# Patient Record
Sex: FEMALE | Race: WHITE | NOT HISPANIC OR LATINO | Employment: FULL TIME | URBAN - NONMETROPOLITAN AREA
[De-identification: names, ages, dates, MRNs, and addresses within clinical notes are randomized per-mention and may not be internally consistent; named-entity substitution may affect disease eponyms.]

---

## 2017-04-24 ENCOUNTER — OFFICE VISIT (OUTPATIENT)
Dept: RETAIL CLINIC | Facility: CLINIC | Age: 57
End: 2017-04-24

## 2017-04-24 ENCOUNTER — APPOINTMENT (OUTPATIENT)
Dept: GENERAL RADIOLOGY | Facility: HOSPITAL | Age: 57
End: 2017-04-24

## 2017-04-24 VITALS
OXYGEN SATURATION: 97 % | DIASTOLIC BLOOD PRESSURE: 66 MMHG | HEART RATE: 83 BPM | TEMPERATURE: 99.3 F | SYSTOLIC BLOOD PRESSURE: 100 MMHG

## 2017-04-24 DIAGNOSIS — Z76.89 REFERRAL OF PATIENT WITHOUT EXAMINATION OR TREATMENT: Primary | ICD-10-CM

## 2017-04-24 PROCEDURE — 71020 HC CHEST PA AND LATERAL: CPT

## 2017-04-24 RX ORDER — ASPIRIN 325 MG
325 TABLET ORAL DAILY
COMMUNITY

## 2017-04-24 NOTE — PROGRESS NOTES
"Subjective   Joanna Garcia is a 56 y.o. female.     Flu Symptoms   This is a new problem. The current episode started in the past 7 days. The problem has been gradually worsening. Associated symptoms include chest pain (Tenderness with deep breath), chills, congestion (Some nasal congestion last night; has improved), a fever (102 Yesterday), headaches, nausea (This morning) and neck pain. Pertinent negatives include no coughing (\"not really\") or vomiting. Treatments tried: Ibuprofen for fever. The treatment provided moderate relief.        The following portions of the patient's history were reviewed and updated as appropriate: allergies, current medications, past family history, past medical history, past social history, past surgical history and problem list.    Review of Systems   Constitutional: Positive for chills and fever (102 Yesterday).   HENT: Positive for congestion (Some nasal congestion last night; has improved), rhinorrhea and sinus pressure.    Respiratory: Positive for wheezing. Negative for cough (\"not really\") and chest tightness.    Cardiovascular: Positive for chest pain (Tenderness with deep breath).   Gastrointestinal: Positive for nausea (This morning). Negative for diarrhea and vomiting.   Musculoskeletal: Positive for neck pain.   Allergic/Immunologic: Positive for environmental allergies.   Neurological: Positive for headaches.       Objective   Physical Exam   Constitutional: She appears well-developed. No distress.   Cardiovascular: Normal rate, regular rhythm and normal heart sounds.  Exam reveals no gallop and no friction rub.    No murmur heard.  Pulmonary/Chest: Effort normal. She has decreased breath sounds in the left upper field. She exhibits tenderness.   Skin: She is not diaphoretic.       Assessment/Plan   Joanna was seen today for flu symptoms.    Diagnoses and all orders for this visit:    Referral of patient without examination or treatment      Patient complained of " left-sided chest discomfort with deep breaths as well as left middle back pain.  Former smoker with a history of pneumonia.  Temp of 102 last night.  Patient states recently in hospital for torn rotator cuff, at which point she has extensive evaluation on her heart that was all normal.  No PCP.  Referred to higher level of care for further evaluation and follow up. Patient agreed to plan.

## 2017-07-16 ENCOUNTER — HOSPITAL ENCOUNTER (EMERGENCY)
Age: 57
Discharge: HOME | End: 2017-07-16
Payer: COMMERCIAL

## 2017-07-16 ENCOUNTER — APPOINTMENT (OUTPATIENT)
Dept: GENERAL RADIOLOGY | Age: 57
End: 2017-07-16
Payer: COMMERCIAL

## 2017-07-16 VITALS
WEIGHT: 100 LBS | HEART RATE: 87 BPM | TEMPERATURE: 98.9 F | SYSTOLIC BLOOD PRESSURE: 111 MMHG | BODY MASS INDEX: 17.07 KG/M2 | HEIGHT: 64 IN | DIASTOLIC BLOOD PRESSURE: 64 MMHG | OXYGEN SATURATION: 98 % | RESPIRATION RATE: 17 BRPM

## 2017-07-16 DIAGNOSIS — J18.9 PNEUMONIA DUE TO ORGANISM: Primary | ICD-10-CM

## 2017-07-16 PROCEDURE — 71020 XR CHEST STANDARD TWO VW: CPT

## 2017-07-16 PROCEDURE — 99285 EMERGENCY DEPT VISIT HI MDM: CPT

## 2017-07-16 PROCEDURE — 99283 EMERGENCY DEPT VISIT LOW MDM: CPT | Performed by: NURSE PRACTITIONER

## 2017-07-16 PROCEDURE — 6370000000 HC RX 637 (ALT 250 FOR IP): Performed by: NURSE PRACTITIONER

## 2017-07-16 PROCEDURE — 94640 AIRWAY INHALATION TREATMENT: CPT

## 2017-07-16 RX ORDER — IPRATROPIUM BROMIDE AND ALBUTEROL SULFATE 2.5; .5 MG/3ML; MG/3ML
1 SOLUTION RESPIRATORY (INHALATION) EVERY 4 HOURS
Qty: 150 ML | Refills: 0 | Status: ON HOLD | OUTPATIENT
Start: 2017-07-16 | End: 2021-12-07 | Stop reason: HOSPADM

## 2017-07-16 RX ORDER — LEVOFLOXACIN 500 MG/1
500 TABLET, FILM COATED ORAL DAILY
Qty: 10 TABLET | Refills: 0 | Status: SHIPPED | OUTPATIENT
Start: 2017-07-16 | End: 2017-07-26

## 2017-07-16 RX ORDER — METHYLPREDNISOLONE 4 MG/1
TABLET ORAL
Qty: 1 KIT | Refills: 0 | Status: SHIPPED | OUTPATIENT
Start: 2017-07-16 | End: 2017-07-22

## 2017-07-16 RX ORDER — IPRATROPIUM BROMIDE AND ALBUTEROL SULFATE 2.5; .5 MG/3ML; MG/3ML
1 SOLUTION RESPIRATORY (INHALATION) ONCE
Status: COMPLETED | OUTPATIENT
Start: 2017-07-16 | End: 2017-07-16

## 2017-07-16 RX ADMIN — IPRATROPIUM BROMIDE AND ALBUTEROL SULFATE 1 AMPULE: .5; 2.5 SOLUTION RESPIRATORY (INHALATION) at 20:01

## 2017-07-16 ASSESSMENT — ENCOUNTER SYMPTOMS
SHORTNESS OF BREATH: 1
COUGH: 1
RHINORRHEA: 0

## 2017-07-16 ASSESSMENT — PAIN SCALES - GENERAL: PAINLEVEL_OUTOF10: 8

## 2017-07-17 ENCOUNTER — APPOINTMENT (OUTPATIENT)
Dept: GENERAL RADIOLOGY | Facility: HOSPITAL | Age: 57
End: 2017-07-17

## 2017-07-17 PROCEDURE — 71020 HC CHEST PA AND LATERAL: CPT

## 2017-12-01 ENCOUNTER — HOSPITAL ENCOUNTER (EMERGENCY)
Facility: HOSPITAL | Age: 57
Discharge: HOME | End: 2017-12-01
Attending: EMERGENCY MEDICINE | Admitting: EMERGENCY MEDICINE

## 2017-12-01 ENCOUNTER — APPOINTMENT (OUTPATIENT)
Dept: GENERAL RADIOLOGY | Facility: HOSPITAL | Age: 57
End: 2017-12-01

## 2017-12-01 VITALS
WEIGHT: 98 LBS | DIASTOLIC BLOOD PRESSURE: 65 MMHG | RESPIRATION RATE: 29 BRPM | TEMPERATURE: 99.2 F | BODY MASS INDEX: 16.73 KG/M2 | OXYGEN SATURATION: 95 % | HEIGHT: 64 IN | HEART RATE: 98 BPM | SYSTOLIC BLOOD PRESSURE: 105 MMHG

## 2017-12-01 DIAGNOSIS — J40 BRONCHITIS: Primary | ICD-10-CM

## 2017-12-01 LAB
ALBUMIN SERPL-MCNC: 4.4 G/DL (ref 3.5–5)
ALBUMIN/GLOB SERPL: 1.3 G/DL (ref 1.1–2.5)
ALP SERPL-CCNC: 88 U/L (ref 24–120)
ALT SERPL W P-5'-P-CCNC: 37 U/L (ref 0–54)
ANION GAP SERPL CALCULATED.3IONS-SCNC: 8 MMOL/L (ref 4–13)
AST SERPL-CCNC: 33 U/L (ref 7–45)
BASOPHILS # BLD AUTO: 0.06 10*3/MM3 (ref 0–0.2)
BASOPHILS NFR BLD AUTO: 0.4 % (ref 0–2)
BILIRUB SERPL-MCNC: 0.3 MG/DL (ref 0.1–1)
BUN BLD-MCNC: 10 MG/DL (ref 5–21)
BUN/CREAT SERPL: 16.1 (ref 7–25)
CALCIUM SPEC-SCNC: 9.8 MG/DL (ref 8.4–10.4)
CHLORIDE SERPL-SCNC: 99 MMOL/L (ref 98–110)
CO2 SERPL-SCNC: 32 MMOL/L (ref 24–31)
CREAT BLD-MCNC: 0.62 MG/DL (ref 0.5–1.4)
D-LACTATE SERPL-SCNC: 1 MMOL/L (ref 0.5–2)
DEPRECATED RDW RBC AUTO: 39.5 FL (ref 40–54)
EOSINOPHIL # BLD AUTO: 0.07 10*3/MM3 (ref 0–0.7)
EOSINOPHIL NFR BLD AUTO: 0.5 % (ref 0–4)
ERYTHROCYTE [DISTWIDTH] IN BLOOD BY AUTOMATED COUNT: 13.2 % (ref 12–15)
FLUAV AG NPH QL: NEGATIVE
FLUBV AG NPH QL IA: NEGATIVE
GFR SERPL CREATININE-BSD FRML MDRD: 99 ML/MIN/1.73
GLOBULIN UR ELPH-MCNC: 3.3 GM/DL
GLUCOSE BLD-MCNC: 93 MG/DL (ref 70–100)
HCT VFR BLD AUTO: 37.4 % (ref 37–47)
HGB BLD-MCNC: 12.4 G/DL (ref 12–16)
HOLD SPECIMEN: NORMAL
HOLD SPECIMEN: NORMAL
IMM GRANULOCYTES # BLD: 0.04 10*3/MM3 (ref 0–0.03)
IMM GRANULOCYTES NFR BLD: 0.3 % (ref 0–5)
LYMPHOCYTES # BLD AUTO: 1.95 10*3/MM3 (ref 0.72–4.86)
LYMPHOCYTES NFR BLD AUTO: 14.2 % (ref 15–45)
MCH RBC QN AUTO: 27.4 PG (ref 28–32)
MCHC RBC AUTO-ENTMCNC: 33.2 G/DL (ref 33–36)
MCV RBC AUTO: 82.6 FL (ref 82–98)
MONOCYTES # BLD AUTO: 0.84 10*3/MM3 (ref 0.19–1.3)
MONOCYTES NFR BLD AUTO: 6.1 % (ref 4–12)
NEUTROPHILS # BLD AUTO: 10.74 10*3/MM3 (ref 1.87–8.4)
NEUTROPHILS NFR BLD AUTO: 78.5 % (ref 39–78)
NRBC BLD MANUAL-RTO: 0 /100 WBC (ref 0–0)
PLATELET # BLD AUTO: 295 10*3/MM3 (ref 130–400)
PMV BLD AUTO: 9.4 FL (ref 6–12)
POTASSIUM BLD-SCNC: 3.4 MMOL/L (ref 3.5–5.3)
PROT SERPL-MCNC: 7.7 G/DL (ref 6.3–8.7)
RBC # BLD AUTO: 4.53 10*6/MM3 (ref 4.2–5.4)
SODIUM BLD-SCNC: 139 MMOL/L (ref 135–145)
WBC NRBC COR # BLD: 13.7 10*3/MM3 (ref 4.8–10.8)
WHOLE BLOOD HOLD SPECIMEN: NORMAL
WHOLE BLOOD HOLD SPECIMEN: NORMAL

## 2017-12-01 PROCEDURE — 99284 EMERGENCY DEPT VISIT MOD MDM: CPT

## 2017-12-01 PROCEDURE — 94640 AIRWAY INHALATION TREATMENT: CPT

## 2017-12-01 PROCEDURE — 80053 COMPREHEN METABOLIC PANEL: CPT | Performed by: EMERGENCY MEDICINE

## 2017-12-01 PROCEDURE — 85025 COMPLETE CBC W/AUTO DIFF WBC: CPT | Performed by: EMERGENCY MEDICINE

## 2017-12-01 PROCEDURE — 87040 BLOOD CULTURE FOR BACTERIA: CPT | Performed by: EMERGENCY MEDICINE

## 2017-12-01 PROCEDURE — 71020 HC CHEST PA AND LATERAL: CPT

## 2017-12-01 PROCEDURE — 83605 ASSAY OF LACTIC ACID: CPT | Performed by: EMERGENCY MEDICINE

## 2017-12-01 PROCEDURE — 87804 INFLUENZA ASSAY W/OPTIC: CPT | Performed by: EMERGENCY MEDICINE

## 2017-12-01 PROCEDURE — 36415 COLL VENOUS BLD VENIPUNCTURE: CPT | Performed by: EMERGENCY MEDICINE

## 2017-12-01 RX ORDER — LEVOFLOXACIN 500 MG/1
500 TABLET, FILM COATED ORAL DAILY
Qty: 7 TABLET | Refills: 0 | Status: SHIPPED | OUTPATIENT
Start: 2017-12-01 | End: 2018-05-11

## 2017-12-01 RX ORDER — IPRATROPIUM BROMIDE AND ALBUTEROL SULFATE 2.5; .5 MG/3ML; MG/3ML
3 SOLUTION RESPIRATORY (INHALATION) ONCE
Status: COMPLETED | OUTPATIENT
Start: 2017-12-01 | End: 2017-12-01

## 2017-12-01 RX ORDER — METHYLPREDNISOLONE 4 MG/1
TABLET ORAL
Qty: 1 EACH | Refills: 0 | Status: SHIPPED | OUTPATIENT
Start: 2017-12-01 | End: 2018-05-11

## 2017-12-01 RX ORDER — SODIUM CHLORIDE 0.9 % (FLUSH) 0.9 %
10 SYRINGE (ML) INJECTION AS NEEDED
Status: DISCONTINUED | OUTPATIENT
Start: 2017-12-01 | End: 2017-12-01 | Stop reason: HOSPADM

## 2017-12-01 RX ADMIN — IPRATROPIUM BROMIDE AND ALBUTEROL SULFATE 3 ML: 2.5; .5 SOLUTION RESPIRATORY (INHALATION) at 16:10

## 2017-12-01 NOTE — ED PROVIDER NOTES
Subjective   HPI Comments: Patient complains of cough and congestion for the past 5-7 days.  She says she is getting getting increasingly short of breath.  She says her throat is sore and sometimes loses her voice.  She says her cough is nonproductive.  She says she has had fever but does not know how high.  She is concerned that she either has pneumonia or the flu.  She quit smoking about 4 years ago.    Patient is a 57 y.o. female presenting with cough.   History provided by:  Patient   used: No    Cough   Cough characteristics:  Non-productive  Severity:  Severe  Onset quality:  Gradual  Duration:  1 week  Timing:  Intermittent  Progression:  Unchanged  Chronicity:  New  Smoker: no    Context: not animal exposure, not exposure to allergens, not fumes, not occupational exposure, not sick contacts, not smoke exposure, not upper respiratory infection, not weather changes and not with activity    Relieved by:  Nothing  Worsened by:  Nothing  Ineffective treatments:  None tried  Associated symptoms: fever, shortness of breath, sinus congestion, sore throat and wheezing    Associated symptoms: no chest pain, no chills, no diaphoresis, no ear fullness, no ear pain, no eye discharge, no headaches, no myalgias, no rash, no rhinorrhea and no weight loss        Review of Systems   Constitutional: Positive for fever. Negative for chills, diaphoresis and weight loss.   HENT: Positive for sore throat. Negative for ear pain and rhinorrhea.    Eyes: Negative for discharge.   Respiratory: Positive for cough, shortness of breath and wheezing.    Cardiovascular: Negative.  Negative for chest pain.   Gastrointestinal: Negative.    Genitourinary: Negative.    Musculoskeletal: Negative.  Negative for myalgias.   Skin: Negative for rash.   Neurological: Negative.  Negative for headaches.   Hematological: Negative.    Psychiatric/Behavioral: Negative.    All other systems reviewed and are negative.      Past Medical  History:   Diagnosis Date   • Pneumonia    • Torn rotator cuff        No Known Allergies    Past Surgical History:   Procedure Laterality Date   •  SECTION     • HYSTERECTOMY         Family History   Problem Relation Age of Onset   • Cancer Mother    • Heart disease Father        Social History     Social History   • Marital status:      Spouse name: N/A   • Number of children: N/A   • Years of education: N/A     Social History Main Topics   • Smoking status: Former Smoker   • Smokeless tobacco: None   • Alcohol use No   • Drug use: No   • Sexual activity: Defer     Other Topics Concern   • None     Social History Narrative   • None       Prior to Admission medications    Medication Sig Start Date End Date Taking? Authorizing Provider   aspirin 325 MG tablet Take 325 mg by mouth Daily.    Historical Provider, MD   levoFLOXacin (LEVAQUIN) 500 MG tablet Take 1 tablet by mouth Daily. 17   CINTIA Hernandez   Multiple Vitamins-Minerals (MULTIVITAMIN ADULT PO) Take  by mouth.    Historical Provider, MD       Medications   sodium chloride 0.9 % flush 10 mL (not administered)   ipratropium-albuterol (DUO-NEB) nebulizer solution 3 mL (3 mL Nebulization Given 17 1610)       Vitals:    17 1634   BP: 115/63   Pulse: 105   Resp: 20   Temp:    SpO2: 95%         Objective   Physical Exam   Constitutional: She is oriented to person, place, and time. She appears well-developed and well-nourished.   HENT:   Head: Normocephalic and atraumatic.   Mouth/Throat: Oropharynx is clear and moist.   Neck: Normal range of motion. Neck supple.   Cardiovascular: Normal rate and regular rhythm.    Pulmonary/Chest:   Patient is mildly tachypnea but not significantly labored.  She does have diffuse scattered wheezes and rhonchi.   Abdominal: Soft. Bowel sounds are normal.   Musculoskeletal: Normal range of motion.   Neurological: She is alert and oriented to person, place, and time.   Skin: Skin is warm.    Psychiatric: She has a normal mood and affect. Her behavior is normal.   Nursing note and vitals reviewed.      Procedures         Lab Results (last 24 hours)     Procedure Component Value Units Date/Time    CBC & Differential [[id number]] Collected:  12/01/17 1609    Specimen:  Blood Updated:  12/01/17 1627    Narrative:       The following orders were created for panel order CBC & Differential.  Procedure                               Abnormality         Status                     ---------                               -----------         ------                     CBC Auto Differential[[id number]]         Abnormal            Final result                 Please view results for these tests on the individual orders.    Comprehensive Metabolic Panel [[id number]]  (Abnormal) Collected:  12/01/17 1609    Specimen:  Blood Updated:  12/01/17 1647     Glucose 93 mg/dL      BUN 10 mg/dL      Creatinine 0.62 mg/dL      Sodium 139 mmol/L      Potassium 3.4 (L) mmol/L      Chloride 99 mmol/L      CO2 32.0 (H) mmol/L      Calcium 9.8 mg/dL      Total Protein 7.7 g/dL      Albumin 4.40 g/dL      ALT (SGPT) 37 U/L      AST (SGOT) 33 U/L      Alkaline Phosphatase 88 U/L      Total Bilirubin 0.3 mg/dL      eGFR Non African Amer 99 mL/min/1.73      Globulin 3.3 gm/dL      A/G Ratio 1.3 g/dL      BUN/Creatinine Ratio 16.1     Anion Gap 8.0 mmol/L     Blood Culture - Blood, [[id number]] Collected:  12/01/17 1609    Specimen:  Blood from Arm, Right Updated:  12/01/17 1625    Blood Culture - Blood, [[id number]] Collected:  12/01/17 1609    Specimen:  Blood from Arm, Right Updated:  12/01/17 1625    Lactic Acid, Plasma [[id number]]  (Normal) Collected:  12/01/17 1609    Specimen:  Blood Updated:  12/01/17 1641     Lactate 1.0 mmol/L     CBC Auto Differential [[id number]]  (Abnormal) Collected:  12/01/17 1609    Specimen:  Blood Updated:  12/01/17 1627     WBC 13.70 (H) 10*3/mm3      RBC 4.53 10*6/mm3      Hemoglobin 12.4 g/dL      Hematocrit 37.4  %      MCV 82.6 fL      MCH 27.4 (L) pg      MCHC 33.2 g/dL      RDW 13.2 %      RDW-SD 39.5 (L) fl      MPV 9.4 fL      Platelets 295 10*3/mm3      Neutrophil % 78.5 (H) %      Lymphocyte % 14.2 (L) %      Monocyte % 6.1 %      Eosinophil % 0.5 %      Basophil % 0.4 %      Immature Grans % 0.3 %      Neutrophils, Absolute 10.74 (H) 10*3/mm3      Lymphocytes, Absolute 1.95 10*3/mm3      Monocytes, Absolute 0.84 10*3/mm3      Eosinophils, Absolute 0.07 10*3/mm3      Basophils, Absolute 0.06 10*3/mm3      Immature Grans, Absolute 0.04 (H) 10*3/mm3      nRBC 0.0 /100 WBC     Influenza Antigen, Rapid - Swab, Nasopharynx [[id number]]  (Normal) Collected:  12/01/17 1635    Specimen:  Swab from Nasopharynx Updated:  12/01/17 1654     Influenza A Ag, EIA Negative     Influenza B Ag, EIA Negative    Narrative:         Recommend confirmation of negative results by viral culture or molecular assay.          XR Chest 2 View   Final Result   1. Chronic change in the pulmonary parenchyma. There is no evidence of   active disease.           This report was finalized on [id number] by Dr. Jose Barrett MD.          ED Course  ED Course   Comment By Time   I told the patient that her testing here was okay and that basically she has a bronchitis and we will treat that. Kana Scales Jr., MD 12/01 1714          MDM  Number of Diagnoses or Management Options  Bronchitis: new and requires workup     Amount and/or Complexity of Data Reviewed  Clinical lab tests: ordered and reviewed  Tests in the radiology section of CPT®: ordered and reviewed    Risk of Complications, Morbidity, and/or Mortality  Presenting problems: moderate  Diagnostic procedures: moderate  Management options: moderate    Patient Progress  Patient progress: stable      Final diagnoses:   Bronchitis          Kana Scales Jr., MD  12/01/17 8931

## 2017-12-06 LAB
BACTERIA SPEC AEROBE CULT: NORMAL
BACTERIA SPEC AEROBE CULT: NORMAL

## 2017-12-21 ENCOUNTER — HOSPITAL ENCOUNTER (EMERGENCY)
Facility: HOSPITAL | Age: 57
Discharge: HOME | End: 2017-12-21
Attending: EMERGENCY MEDICINE | Admitting: EMERGENCY MEDICINE

## 2017-12-21 ENCOUNTER — APPOINTMENT (OUTPATIENT)
Dept: GENERAL RADIOLOGY | Facility: HOSPITAL | Age: 57
End: 2017-12-21

## 2017-12-21 VITALS
SYSTOLIC BLOOD PRESSURE: 101 MMHG | TEMPERATURE: 98.9 F | DIASTOLIC BLOOD PRESSURE: 54 MMHG | RESPIRATION RATE: 20 BRPM | OXYGEN SATURATION: 100 % | HEART RATE: 74 BPM | HEIGHT: 64 IN | WEIGHT: 92 LBS | BODY MASS INDEX: 15.71 KG/M2

## 2017-12-21 DIAGNOSIS — R55 VASOVAGAL NEAR SYNCOPE: ICD-10-CM

## 2017-12-21 DIAGNOSIS — E86.0 DEHYDRATION: ICD-10-CM

## 2017-12-21 DIAGNOSIS — J20.9 ACUTE BRONCHITIS, UNSPECIFIED ORGANISM: Primary | ICD-10-CM

## 2017-12-21 DIAGNOSIS — R53.1 WEAKNESS: ICD-10-CM

## 2017-12-21 LAB
ALBUMIN SERPL-MCNC: 4.1 G/DL (ref 3.5–5)
ALBUMIN/GLOB SERPL: 1.5 G/DL (ref 1.1–2.5)
ALP SERPL-CCNC: 63 U/L (ref 24–120)
ALT SERPL W P-5'-P-CCNC: 34 U/L (ref 0–54)
ANION GAP SERPL CALCULATED.3IONS-SCNC: 9 MMOL/L (ref 4–13)
APTT PPP: 27.3 SECONDS (ref 24.1–34.8)
ARTERIAL PATENCY WRIST A: ABNORMAL
AST SERPL-CCNC: 29 U/L (ref 7–45)
ATMOSPHERIC PRESS: 749 MMHG
BACTERIA UR QL AUTO: ABNORMAL /HPF
BASE EXCESS BLDA CALC-SCNC: 3.4 MMOL/L (ref 0–2)
BASOPHILS # BLD AUTO: 0.03 10*3/MM3 (ref 0–0.2)
BASOPHILS NFR BLD AUTO: 0.5 % (ref 0–2)
BDY SITE: ABNORMAL
BILIRUB SERPL-MCNC: 0.3 MG/DL (ref 0.1–1)
BILIRUB UR QL STRIP: NEGATIVE
BODY TEMPERATURE: 37 C
BUN BLD-MCNC: 15 MG/DL (ref 5–21)
BUN/CREAT SERPL: 23.4 (ref 7–25)
CA-I BLD-MCNC: 4.71 MG/DL (ref 4.6–5.4)
CALCIUM SPEC-SCNC: 9.5 MG/DL (ref 8.4–10.4)
CHLORIDE SERPL-SCNC: 99 MMOL/L (ref 98–110)
CLARITY UR: ABNORMAL
CO2 SERPL-SCNC: 30 MMOL/L (ref 24–31)
COLOR UR: YELLOW
CREAT BLD-MCNC: 0.64 MG/DL (ref 0.5–1.4)
CRP SERPL-MCNC: 2.07 MG/DL (ref 0–0.99)
D DIMER PPP FEU-MCNC: 0.38 MG/L (FEU) (ref 0–0.5)
D-LACTATE SERPL-SCNC: 0.9 MMOL/L (ref 0.5–2)
DEPRECATED RDW RBC AUTO: 41.1 FL (ref 40–54)
EOSINOPHIL # BLD AUTO: 0 10*3/MM3 (ref 0–0.7)
EOSINOPHIL NFR BLD AUTO: 0 % (ref 0–4)
ERYTHROCYTE [DISTWIDTH] IN BLOOD BY AUTOMATED COUNT: 13.9 % (ref 12–15)
FLUAV AG NPH QL: NEGATIVE
FLUBV AG NPH QL IA: NEGATIVE
GFR SERPL CREATININE-BSD FRML MDRD: 96 ML/MIN/1.73
GLOBULIN UR ELPH-MCNC: 2.8 GM/DL
GLUCOSE BLD-MCNC: 87 MG/DL (ref 70–100)
GLUCOSE UR STRIP-MCNC: NEGATIVE MG/DL
HCO3 BLDA-SCNC: 26.8 MMOL/L (ref 20–26)
HCT VFR BLD AUTO: 37.2 % (ref 37–47)
HGB BLD-MCNC: 12.6 G/DL (ref 12–16)
HGB UR QL STRIP.AUTO: ABNORMAL
HOLD SPECIMEN: NORMAL
HOLD SPECIMEN: NORMAL
HYALINE CASTS UR QL AUTO: ABNORMAL /LPF
IMM GRANULOCYTES # BLD: 0.01 10*3/MM3 (ref 0–0.03)
IMM GRANULOCYTES NFR BLD: 0.2 % (ref 0–5)
INR PPP: 0.94 (ref 0.91–1.09)
KETONES UR QL STRIP: ABNORMAL
LEUKOCYTE ESTERASE UR QL STRIP.AUTO: NEGATIVE
LYMPHOCYTES # BLD AUTO: 1.02 10*3/MM3 (ref 0.72–4.86)
LYMPHOCYTES NFR BLD AUTO: 17.6 % (ref 15–45)
Lab: ABNORMAL
Lab: NORMAL
MAGNESIUM SERPL-MCNC: 1.5 MG/DL (ref 1.4–2.2)
MCH RBC QN AUTO: 27.9 PG (ref 28–32)
MCHC RBC AUTO-ENTMCNC: 33.9 G/DL (ref 33–36)
MCV RBC AUTO: 82.3 FL (ref 82–98)
MODALITY: ABNORMAL
MONOCYTES # BLD AUTO: 0.46 10*3/MM3 (ref 0.19–1.3)
MONOCYTES NFR BLD AUTO: 8 % (ref 4–12)
NEUTROPHILS # BLD AUTO: 4.26 10*3/MM3 (ref 1.87–8.4)
NEUTROPHILS NFR BLD AUTO: 73.7 % (ref 39–78)
NITRITE UR QL STRIP: NEGATIVE
NRBC BLD MANUAL-RTO: 0 /100 WBC (ref 0–0)
NT-PROBNP SERPL-MCNC: 98.4 PG/ML (ref 0–900)
PCO2 BLDA: 35.8 MM HG (ref 35–45)
PH BLDA: 7.48 PH UNITS (ref 7.35–7.45)
PH UR STRIP.AUTO: 5.5 [PH] (ref 5–8)
PHOSPHATE SERPL-MCNC: 2.9 MG/DL (ref 2.5–4.5)
PLATELET # BLD AUTO: 235 10*3/MM3 (ref 130–400)
PMV BLD AUTO: 9.6 FL (ref 6–12)
PO2 BLDA: 71.4 MM HG (ref 83–108)
POTASSIUM BLD-SCNC: 3.9 MMOL/L (ref 3.5–5.3)
PROT SERPL-MCNC: 6.9 G/DL (ref 6.3–8.7)
PROT UR QL STRIP: NEGATIVE
PROTHROMBIN TIME: 12.9 SECONDS (ref 11.9–14.6)
RBC # BLD AUTO: 4.52 10*6/MM3 (ref 4.2–5.4)
RBC # UR: ABNORMAL /HPF
REF LAB TEST METHOD: ABNORMAL
SAO2 % BLDCOA: 96.2 % (ref 94–99)
SODIUM BLD-SCNC: 138 MMOL/L (ref 135–145)
SP GR UR STRIP: >1.03 (ref 1–1.03)
SQUAMOUS #/AREA URNS HPF: ABNORMAL /HPF
TROPONIN I SERPL-MCNC: <0.012 NG/ML (ref 0–0.03)
UROBILINOGEN UR QL STRIP: ABNORMAL
VENTILATOR MODE: ABNORMAL
WBC NRBC COR # BLD: 5.78 10*3/MM3 (ref 4.8–10.8)
WBC UR QL AUTO: ABNORMAL /HPF
WHOLE BLOOD HOLD SPECIMEN: NORMAL
WHOLE BLOOD HOLD SPECIMEN: NORMAL

## 2017-12-21 PROCEDURE — 84100 ASSAY OF PHOSPHORUS: CPT | Performed by: EMERGENCY MEDICINE

## 2017-12-21 PROCEDURE — 71010 HC CHEST PA OR AP: CPT

## 2017-12-21 PROCEDURE — 81001 URINALYSIS AUTO W/SCOPE: CPT | Performed by: EMERGENCY MEDICINE

## 2017-12-21 PROCEDURE — 87040 BLOOD CULTURE FOR BACTERIA: CPT | Performed by: EMERGENCY MEDICINE

## 2017-12-21 PROCEDURE — 83735 ASSAY OF MAGNESIUM: CPT | Performed by: EMERGENCY MEDICINE

## 2017-12-21 PROCEDURE — 93010 ELECTROCARDIOGRAM REPORT: CPT | Performed by: INTERNAL MEDICINE

## 2017-12-21 PROCEDURE — 93005 ELECTROCARDIOGRAM TRACING: CPT | Performed by: EMERGENCY MEDICINE

## 2017-12-21 PROCEDURE — 85730 THROMBOPLASTIN TIME PARTIAL: CPT | Performed by: EMERGENCY MEDICINE

## 2017-12-21 PROCEDURE — 25010000002 PIPERACILLIN SOD-TAZOBACTAM PER 1 G: Performed by: EMERGENCY MEDICINE

## 2017-12-21 PROCEDURE — 87804 INFLUENZA ASSAY W/OPTIC: CPT | Performed by: EMERGENCY MEDICINE

## 2017-12-21 PROCEDURE — 80053 COMPREHEN METABOLIC PANEL: CPT | Performed by: EMERGENCY MEDICINE

## 2017-12-21 PROCEDURE — 83880 ASSAY OF NATRIURETIC PEPTIDE: CPT | Performed by: EMERGENCY MEDICINE

## 2017-12-21 PROCEDURE — 84484 ASSAY OF TROPONIN QUANT: CPT | Performed by: EMERGENCY MEDICINE

## 2017-12-21 PROCEDURE — 86140 C-REACTIVE PROTEIN: CPT | Performed by: EMERGENCY MEDICINE

## 2017-12-21 PROCEDURE — 96366 THER/PROPH/DIAG IV INF ADDON: CPT

## 2017-12-21 PROCEDURE — 36600 WITHDRAWAL OF ARTERIAL BLOOD: CPT

## 2017-12-21 PROCEDURE — 99285 EMERGENCY DEPT VISIT HI MDM: CPT

## 2017-12-21 PROCEDURE — 83605 ASSAY OF LACTIC ACID: CPT | Performed by: EMERGENCY MEDICINE

## 2017-12-21 PROCEDURE — 96365 THER/PROPH/DIAG IV INF INIT: CPT

## 2017-12-21 PROCEDURE — 85379 FIBRIN DEGRADATION QUANT: CPT | Performed by: EMERGENCY MEDICINE

## 2017-12-21 PROCEDURE — 85025 COMPLETE CBC W/AUTO DIFF WBC: CPT | Performed by: EMERGENCY MEDICINE

## 2017-12-21 PROCEDURE — 85610 PROTHROMBIN TIME: CPT | Performed by: EMERGENCY MEDICINE

## 2017-12-21 PROCEDURE — 82330 ASSAY OF CALCIUM: CPT

## 2017-12-21 PROCEDURE — 82803 BLOOD GASES ANY COMBINATION: CPT

## 2017-12-21 RX ORDER — METHYLPREDNISOLONE 4 MG/1
TABLET ORAL
Qty: 21 TABLET | Refills: 0 | Status: SHIPPED | OUTPATIENT
Start: 2017-12-21 | End: 2018-05-11

## 2017-12-21 RX ORDER — AZITHROMYCIN 250 MG/1
TABLET, FILM COATED ORAL
Qty: 6 TABLET | Refills: 0 | Status: SHIPPED | OUTPATIENT
Start: 2017-12-21 | End: 2018-05-11

## 2017-12-21 RX ORDER — SODIUM CHLORIDE 0.9 % (FLUSH) 0.9 %
10 SYRINGE (ML) INJECTION AS NEEDED
Status: DISCONTINUED | OUTPATIENT
Start: 2017-12-21 | End: 2017-12-21 | Stop reason: HOSPADM

## 2017-12-21 RX ADMIN — TAZOBACTAM SODIUM AND PIPERACILLIN SODIUM 4.5 G: 500; 4 INJECTION, SOLUTION INTRAVENOUS at 15:35

## 2017-12-21 NOTE — ED PROVIDER NOTES
Subjective   HPI Comments: Patient was recently treated with the antibiotics today she came in and nausea and vomiting and a near syncopal episode no loss of consciousness not feeling well short of breath in the ER for evaluation of the same    Patient is a 57 y.o. female presenting with syncope.   Syncope   Episode history:  Single  Most recent episode:  Yesterday  Timing:  Constant  Progression:  Resolved  Context: standing up    Context: not blood draw, not bowel movement, not exertion, not medication change, not with normal activity and not sight of blood    Witnessed: no    Relieved by:  Nothing  Worsened by:  Nothing  Ineffective treatments:  None tried  Associated symptoms: diaphoresis, malaise/fatigue, nausea and weakness    Associated symptoms: no anxiety, no chest pain, no confusion, no dizziness, no fever, no headaches, no palpitations, no recent injury, no seizures, no shortness of breath and no vomiting        Review of Systems   Constitutional: Positive for diaphoresis and malaise/fatigue. Negative for fever.   HENT: Negative.    Eyes: Negative.    Respiratory: Negative.  Negative for shortness of breath.    Cardiovascular: Positive for syncope. Negative for chest pain and palpitations.   Gastrointestinal: Positive for nausea. Negative for vomiting.   Musculoskeletal: Negative.  Negative for back pain and neck pain.   Skin: Negative.    Neurological: Positive for weakness. Negative for dizziness, seizures and headaches.   Psychiatric/Behavioral: Negative for confusion.   All other systems reviewed and are negative.      Past Medical History:   Diagnosis Date   • Pneumonia    • Torn rotator cuff        No Known Allergies    Past Surgical History:   Procedure Laterality Date   •  SECTION     • HYSTERECTOMY         Family History   Problem Relation Age of Onset   • Cancer Mother    • Heart disease Father        Social History     Social History   • Marital status:      Spouse name: N/A   •  Number of children: N/A   • Years of education: N/A     Social History Main Topics   • Smoking status: Former Smoker     Quit date: 12/21/2013   • Smokeless tobacco: Never Used   • Alcohol use No   • Drug use: No   • Sexual activity: Defer     Other Topics Concern   • None     Social History Narrative   • None           Objective   Physical Exam   Constitutional: She is oriented to person, place, and time. She appears well-developed and well-nourished.   HENT:   Head: Normocephalic.   Right Ear: External ear normal.   Eyes: Conjunctivae are normal. Pupils are equal, round, and reactive to light.   Neck: Normal range of motion. Neck supple.   Cardiovascular: Normal rate, regular rhythm, normal heart sounds and intact distal pulses.  PMI is not displaced.  Exam reveals no decreased pulses.    No murmur heard.  Pulmonary/Chest: Effort normal. No accessory muscle usage. No tachypnea. No respiratory distress. She has no decreased breath sounds. She has wheezes. She has no rales. She exhibits no tenderness.   Abdominal: Soft. Bowel sounds are normal. There is no tenderness.   Musculoskeletal: Normal range of motion. She exhibits no edema or tenderness.   Lower extremity exam bilaterally is unremarkable.  There is no right or left calf tenderness .  There is no palpable venous cord.  No obvious difference in the size of the legs.  No pitting edema.  The dorsalis pedis and posterior tibial femoral and popliteal pulses are palpable and +2 bilaterally.  Homans sign is negative       Vascular Status -  Her exam exhibits right foot vasculature normal. Her exam exhibits no right foot edema. Her exam exhibits left foot vasculature normal. Her exam exhibits no left foot edema.  Neurological: She is alert and oriented to person, place, and time. She has normal reflexes. No cranial nerve deficit. Coordination normal.   Skin: Skin is warm. No rash noted. No erythema.   Nursing note and vitals reviewed.      Procedures         ED  Course  ED Course   Comment By Time   Patient's workup is negative lab workup was negative patient given IV fluids she had a near syncopal episode today currently awake and alert will be discharged home with a follow-up with the primary [facility]. Iker Easley MD 12/21 1936   Low wells score Iker Easley MD 12/21 1937   Pt feels better will be dc home with follow up with primary md and to return to the ed as needed Iker Easley MD 12/21 1938   Last bp is 118/72 with HR 76 Iker Easley MD 12/21 1941                  MDM    Final diagnoses:   Acute bronchitis, unspecified organism   Weakness   Vasovagal near syncope   Dehydration            Iker Easley MD  12/21/17 1941       Ikre Easley MD  12/21/17 1941

## 2017-12-26 LAB
BACTERIA SPEC AEROBE CULT: NORMAL
BACTERIA SPEC AEROBE CULT: NORMAL

## 2018-05-11 ENCOUNTER — HOSPITAL ENCOUNTER (OUTPATIENT)
Dept: GENERAL RADIOLOGY | Facility: HOSPITAL | Age: 58
Discharge: HOME | End: 2018-05-11
Admitting: NURSE PRACTITIONER

## 2018-05-11 PROCEDURE — 71046 X-RAY EXAM CHEST 2 VIEWS: CPT

## 2018-05-14 ENCOUNTER — HOSPITAL ENCOUNTER (OUTPATIENT)
Dept: GENERAL RADIOLOGY | Facility: HOSPITAL | Age: 58
Discharge: HOME | End: 2018-05-14
Admitting: NURSE PRACTITIONER

## 2018-05-14 PROCEDURE — 71045 X-RAY EXAM CHEST 1 VIEW: CPT

## 2018-10-23 ENCOUNTER — HOSPITAL ENCOUNTER (OUTPATIENT)
Dept: GENERAL RADIOLOGY | Facility: HOSPITAL | Age: 58
Discharge: HOME | End: 2018-10-23
Admitting: NURSE PRACTITIONER

## 2018-10-23 PROCEDURE — 71046 X-RAY EXAM CHEST 2 VIEWS: CPT

## 2020-01-05 ENCOUNTER — HOSPITAL ENCOUNTER (EMERGENCY)
Age: 60
Discharge: HOME | End: 2020-01-05
Attending: EMERGENCY MEDICINE
Payer: COMMERCIAL

## 2020-01-05 VITALS
TEMPERATURE: 98.2 F | SYSTOLIC BLOOD PRESSURE: 114 MMHG | BODY MASS INDEX: 16.22 KG/M2 | RESPIRATION RATE: 16 BRPM | OXYGEN SATURATION: 97 % | HEART RATE: 67 BPM | WEIGHT: 95 LBS | DIASTOLIC BLOOD PRESSURE: 71 MMHG | HEIGHT: 64 IN

## 2020-01-05 PROCEDURE — 99282 EMERGENCY DEPT VISIT SF MDM: CPT

## 2020-01-05 RX ORDER — PREDNISONE 20 MG/1
20 TABLET ORAL DAILY
Qty: 5 TABLET | Refills: 0 | Status: SHIPPED | OUTPATIENT
Start: 2020-01-05 | End: 2020-01-10

## 2020-01-05 RX ORDER — DOXYCYCLINE HYCLATE 100 MG/1
100 CAPSULE ORAL 2 TIMES DAILY
Qty: 10 CAPSULE | Refills: 0 | Status: SHIPPED | OUTPATIENT
Start: 2020-01-05 | End: 2020-01-10

## 2020-01-05 RX ORDER — ASCORBIC ACID 500 MG
500 TABLET ORAL DAILY
COMMUNITY
End: 2021-12-16 | Stop reason: ALTCHOICE

## 2020-01-05 RX ORDER — RANITIDINE 150 MG/1
150 CAPSULE ORAL 2 TIMES DAILY
COMMUNITY
End: 2021-12-16 | Stop reason: ALTCHOICE

## 2020-01-05 RX ORDER — IBUPROFEN 200 MG
400 TABLET ORAL EVERY 6 HOURS PRN
COMMUNITY
End: 2021-12-16 | Stop reason: ALTCHOICE

## 2020-01-05 RX ORDER — CHOLECALCIFEROL (VITAMIN D3) 125 MCG
500 CAPSULE ORAL DAILY
COMMUNITY
End: 2021-12-16 | Stop reason: ALTCHOICE

## 2020-01-05 ASSESSMENT — PAIN SCALES - GENERAL: PAINLEVEL_OUTOF10: 7

## 2020-01-05 ASSESSMENT — PAIN DESCRIPTION - DESCRIPTORS: DESCRIPTORS: TIGHTNESS

## 2020-01-05 ASSESSMENT — ENCOUNTER SYMPTOMS
EYE PAIN: 0
DIARRHEA: 0
VOMITING: 0
COUGH: 1
ABDOMINAL PAIN: 0
SHORTNESS OF BREATH: 0

## 2020-01-05 ASSESSMENT — PAIN DESCRIPTION - PAIN TYPE: TYPE: ACUTE PAIN

## 2020-01-05 NOTE — ED PROVIDER NOTES
[facility] [place] EMERGENCY DEPT  eMERGENCY dEPARTMENT eNCOUnter      Pt Name: Brenden Curry  MRN: [id number]  [place] 1960  Date of evaluation: 2020  Provider: Toni Osman MD    [address] [place] [address]       Chief Complaint   Patient presents with    Cough     x1 week, pt states that she has been coughing up yellow, pt denies fever         HISTORY OF PRESENT ILLNESS   (Location/Symptom, Timing/Onset,Context/Setting, Quality, Duration, Modifying Factors, Severity)  Note limiting factors. Brenden Curry is a 61 y.o. female who presents to the emergency department complaining of 1 week of cough. Cough has been productive of yellow sputum. Questionable low-grade fever few days ago. No current fever. No chest pain or trouble breathing. Symptoms feel similar to when she has had bronchitis and/or pneumonia before. No unilateral leg swelling or pain. No history of DVT or PE. No vomiting or diarrhea. Is a smoker. Has a history of COPD. HPI    NursingNotes were reviewed. REVIEW OF SYSTEMS    (2-9 systems for level 4, 10 or more for level 5)     Review of Systems   Constitutional: Negative for fever. HENT: Positive for congestion. Eyes: Negative for pain. Respiratory: Positive for cough. Negative for shortness of breath. Cardiovascular: Negative for chest pain and palpitations. Gastrointestinal: Negative for abdominal pain, diarrhea and vomiting. Genitourinary: Negative for dysuria. Skin: Negative for rash. Neurological: Negative for weakness and headaches. All other systems reviewed and are negative. A complete review of systems was performed and is negative except as noted above in the HPI.        PAST MEDICAL HISTORY     Past Medical History:   Diagnosis Date    Acid reflux     Bronchitis     Pneumonia     pt states she has an old spont on her lung due past pneumonia         SURGICAL HISTORY       Past Surgical History:   Procedure Laterality Date     SECTION     

## 2020-03-08 ENCOUNTER — APPOINTMENT (OUTPATIENT)
Dept: GENERAL RADIOLOGY | Age: 60
End: 2020-03-08
Payer: COMMERCIAL

## 2020-03-08 ENCOUNTER — HOSPITAL ENCOUNTER (EMERGENCY)
Age: 60
Discharge: HOME | End: 2020-03-08
Payer: COMMERCIAL

## 2020-03-08 VITALS
HEART RATE: 98 BPM | HEIGHT: 64 IN | DIASTOLIC BLOOD PRESSURE: 68 MMHG | SYSTOLIC BLOOD PRESSURE: 145 MMHG | RESPIRATION RATE: 16 BRPM | TEMPERATURE: 98.1 F | WEIGHT: 95 LBS | BODY MASS INDEX: 16.22 KG/M2 | OXYGEN SATURATION: 95 %

## 2020-03-08 LAB
RAPID INFLUENZA  B AGN: NEGATIVE
RAPID INFLUENZA A AGN: NEGATIVE

## 2020-03-08 PROCEDURE — 99285 EMERGENCY DEPT VISIT HI MDM: CPT

## 2020-03-08 PROCEDURE — 87804 INFLUENZA ASSAY W/OPTIC: CPT

## 2020-03-08 PROCEDURE — 93005 ELECTROCARDIOGRAM TRACING: CPT | Performed by: NURSE PRACTITIONER

## 2020-03-08 PROCEDURE — 71046 X-RAY EXAM CHEST 2 VIEWS: CPT

## 2020-03-08 RX ORDER — PREDNISONE 20 MG/1
20 TABLET ORAL 2 TIMES DAILY
Qty: 10 TABLET | Refills: 0 | Status: SHIPPED | OUTPATIENT
Start: 2020-03-08 | End: 2020-03-13

## 2020-03-08 RX ORDER — GUAIFENESIN 600 MG/1
600 TABLET, EXTENDED RELEASE ORAL 2 TIMES DAILY
Qty: 30 TABLET | Refills: 0 | Status: SHIPPED | OUTPATIENT
Start: 2020-03-08 | End: 2020-03-23

## 2020-03-08 RX ORDER — ALBUTEROL SULFATE 90 UG/1
2 AEROSOL, METERED RESPIRATORY (INHALATION) 4 TIMES DAILY PRN
Qty: 1 INHALER | Refills: 0 | Status: SHIPPED | OUTPATIENT
Start: 2020-03-08

## 2020-03-08 RX ORDER — DOXYCYCLINE HYCLATE 100 MG
100 TABLET ORAL 2 TIMES DAILY
Qty: 20 TABLET | Refills: 0 | Status: SHIPPED | OUTPATIENT
Start: 2020-03-08 | End: 2020-03-18

## 2020-03-08 ASSESSMENT — PAIN DESCRIPTION - PAIN TYPE: TYPE: ACUTE PAIN

## 2020-03-08 ASSESSMENT — PAIN SCALES - GENERAL: PAINLEVEL_OUTOF10: 7

## 2020-03-08 ASSESSMENT — PAIN DESCRIPTION - LOCATION: LOCATION: GENERALIZED

## 2020-03-08 ASSESSMENT — ENCOUNTER SYMPTOMS
COUGH: 1
NAUSEA: 1

## 2020-03-08 NOTE — ED PROVIDER NOTES
Concern    None   Social History Narrative    None       SCREENINGS             PHYSICAL EXAM    (up to 7 for level 4, 8 or more for level 5)     ED Triage Vitals [03/08/20 1323]   BP Temp Temp Source Pulse Resp SpO2 Height Weight   (!) 145/68 98.1 °F (36.7 °C) Oral 98 16 95 % 5' 4\" (1.626 m) 95 lb (43.1 kg)       Physical Exam  Vitals signs reviewed. HENT:      Head: Normocephalic. Right Ear: External ear normal.      Left Ear: External ear normal.   Eyes:      Conjunctiva/sclera: Conjunctivae normal.      Pupils: Pupils are equal, round, and reactive to light. Neck:      Musculoskeletal: Normal range of motion. Cardiovascular:      Rate and Rhythm: Normal rate and regular rhythm. Heart sounds: Normal heart sounds. Pulmonary:      Effort: Pulmonary effort is normal.      Breath sounds: Normal breath sounds. Comments: Decreased breath sounds throughout   Abdominal:      General: Bowel sounds are normal.      Palpations: Abdomen is soft. Musculoskeletal: Normal range of motion. Comments: No calf muscle ttp  No lower extremity edema   Skin:     General: Skin is warm and dry. Neurological:      Mental Status: She is alert and oriented to person, place, and time. DIAGNOSTIC RESULTS     EKG: All EKG's are interpreted by the Emergency Department Physician who either signs or Co-signs this chart in the absence of acardiologist.    There is a regular rate and rhythm. ST hr 109b/min Normal DE interval and normal P waves. Normal QRS interval. Normal QT interval. No obvious ST elevation or ST depression. RADIOLOGY:   Non-plain film images such as CT, Ultrasound andMRI are read by the radiologist. Plain radiographic images are visualized and preliminarily interpreted by the emergency physician with the below findings:        Interpretation per the Radiologist below, if available at the time of this note:    XR CHEST STANDARD (2 VW)   Final Result   1.  COPD no acute cardiopulmonary ZANTAC     vitamin B-12 500 MCG tablet  Commonly known as:  CYANOCOBALAMIN     vitamin C 500 MG tablet  Commonly known as:  ASCORBIC ACID           Where to Get Your Medications      You can get these medications from any pharmacy    Bring a paper prescription for each of these medications  · albuterol sulfate  (90 Base) MCG/ACT inhaler  · doxycycline hyclate 100 MG tablet  · guaiFENesin 600 MG extended release tablet  · predniSONE 20 MG tablet           (Pleasenote that portions of this note were completed with a voice recognition program.  Efforts were made to edit the dictations but occasionally words are mis-transcribed.)              Liane Da Silva, APRN  03/08/20 5699

## 2020-03-10 LAB
EKG P AXIS: 81 DEGREES
EKG P-R INTERVAL: 126 MS
EKG Q-T INTERVAL: 282 MS
EKG QRS DURATION: 90 MS
EKG QTC CALCULATION (BAZETT): 367 MS
EKG T AXIS: -18 DEGREES

## 2020-03-10 PROCEDURE — 93010 ELECTROCARDIOGRAM REPORT: CPT | Performed by: INTERNAL MEDICINE

## 2020-10-29 ENCOUNTER — HOSPITAL ENCOUNTER (OUTPATIENT)
Dept: GENERAL RADIOLOGY | Facility: HOSPITAL | Age: 60
Discharge: HOME | End: 2020-10-29
Admitting: NURSE PRACTITIONER

## 2020-10-29 PROCEDURE — 73140 X-RAY EXAM OF FINGER(S): CPT

## 2020-11-15 ENCOUNTER — HOSPITAL ENCOUNTER (EMERGENCY)
Age: 60
Discharge: HOME | End: 2020-11-15
Attending: EMERGENCY MEDICINE
Payer: COMMERCIAL

## 2020-11-15 ENCOUNTER — APPOINTMENT (OUTPATIENT)
Dept: GENERAL RADIOLOGY | Age: 60
End: 2020-11-15
Payer: COMMERCIAL

## 2020-11-15 VITALS
BODY MASS INDEX: 15.36 KG/M2 | OXYGEN SATURATION: 96 % | SYSTOLIC BLOOD PRESSURE: 110 MMHG | HEIGHT: 64 IN | WEIGHT: 90 LBS | HEART RATE: 70 BPM | RESPIRATION RATE: 15 BRPM | TEMPERATURE: 98.7 F | DIASTOLIC BLOOD PRESSURE: 70 MMHG

## 2020-11-15 PROCEDURE — 99999 PR OFFICE/OUTPT VISIT,PROCEDURE ONLY: CPT | Performed by: EMERGENCY MEDICINE

## 2020-11-15 PROCEDURE — 99999 PR OFFICE/OUTPT VISIT,PROCEDURE ONLY: CPT | Performed by: NURSE PRACTITIONER

## 2020-11-15 PROCEDURE — 99282 EMERGENCY DEPT VISIT SF MDM: CPT

## 2020-11-15 PROCEDURE — 73090 X-RAY EXAM OF FOREARM: CPT

## 2020-11-15 ASSESSMENT — ENCOUNTER SYMPTOMS
COLOR CHANGE: 0
ABDOMINAL PAIN: 0
SHORTNESS OF BREATH: 0

## 2020-11-15 ASSESSMENT — PAIN SCALES - GENERAL: PAINLEVEL_OUTOF10: 10

## 2020-11-15 NOTE — ED PROVIDER NOTES
Pain    IPRATROPIUM-ALBUTEROL (DUONEB) 0.5-2.5 (3) MG/3ML SOLN NEBULIZER SOLUTION    Inhale 3 mLs into the lungs every 4 hours    RANITIDINE (ZANTAC) 150 MG CAPSULE    Take 150 mg by mouth 2 times daily    VITAMIN B-12 (CYANOCOBALAMIN) 500 MCG TABLET    Take 500 mcg by mouth daily    VITAMIN C (ASCORBIC ACID) 500 MG TABLET    Take 500 mg by mouth daily       ALLERGIES     Patient has no known allergies.     FAMILY HISTORY       Family History   Problem Relation Age of Onset    Cancer Mother     Heart Disease Father           SOCIAL HISTORY       Social History     Socioeconomic History    Marital status:      Spouse name: None    Number of children: None    Years of education: None    Highest education level: None   Occupational History    None   Social Needs    Financial resource strain: None    Food insecurity     Worry: None     Inability: None    Transportation needs     Medical: None     Non-medical: None   Tobacco Use    Smoking status: Current Every Day Smoker     Packs/day: 1.00     Types: Cigarettes    Smokeless tobacco: Never Used   Substance and Sexual Activity    Alcohol use: No    Drug use: No    Sexual activity: None   Lifestyle    Physical activity     Days per week: None     Minutes per session: None    Stress: None   Relationships    Social connections     Talks on phone: None     Gets together: None     Attends [employer] service: None     Active member of club or organization: None     Attends meetings of clubs or organizations: None     Relationship status: None    Intimate partner violence     Fear of current or ex partner: None     Emotionally abused: None     Physically abused: None     Forced sexual activity: None   Other Topics Concern    None   Social History Narrative    None       SCREENINGS             PHYSICAL EXAM    (up to 7 for level 4, 8 or more for level 5)     ED Triage Vitals [11/15/20 1559]   BP Temp Temp src Pulse Resp SpO2 Height Weight   110/73 98.7 °F (37.1 °C) -- 73 16 97 % 5' 4\" (1.626 m) 90 lb (40.8 kg)       Physical Exam  Vitals signs and nursing note reviewed. Constitutional:       General: She is not in acute distress. Appearance: She is well-developed and underweight. She is not ill-appearing or diaphoretic. HENT:      Head: Normocephalic and atraumatic. Right Ear: External ear normal.      Left Ear: External ear normal.   Eyes:      Conjunctiva/sclera: Conjunctivae normal.   Neck:      Musculoskeletal: Normal range of motion. Trachea: No tracheal deviation. Cardiovascular:      Rate and Rhythm: Normal rate and regular rhythm. Heart sounds: Normal heart sounds. No murmur. Pulmonary:      Effort: No respiratory distress. Breath sounds: Normal breath sounds. No wheezing or rales. Musculoskeletal: Normal range of motion. Arms:       Right lower leg: No edema. Left lower leg: No edema. Comments: Somewhat oval-shaped tender to palpation lesion approximately 4 x 3 cm no erythema or open wounds    Distal pulses intact   Skin:     General: Skin is warm and dry. Neurological:      Mental Status: She is alert and oriented to person, place, and time. GCS: GCS eye subscore is 4. GCS verbal subscore is 5. GCS motor subscore is 6. DIAGNOSTIC RESULTS         RADIOLOGY:  Non-plain film images such as CT, Ultrasound and MRI are read by the radiologist. Plain radiographic images are visualized and preliminarily interpreted bythe emergency physician with the below findings:          XR RADIUS ULNA RIGHT (2 VIEWS)    (Results Pending)           LABS:  Labs Reviewed - No data to display    All other labs were within normal range or not returned as of this dictation.     EMERGENCY DEPARTMENT COURSE and DIFFERENTIAL DIAGNOSIS/MDM:   Vitals:    Vitals:    11/15/20 1559   BP: 110/73   Pulse: 73   Resp: 16   Temp: 98.7 °F (37.1 °C)   SpO2: 97%   Weight: 90 lb (40.8 kg)   Height: 5' 4\" (1.626 m)       MDM  Number of Diagnoses or Management Options     Amount and/or Complexity of Data Reviewed  Tests in the radiology section of CPT®: ordered and reviewed  Independent visualization of images, tracings, or specimens: yes      Suspect didn't realize she bumped her arm causing contusion, stable for dc, discussed supportive care      CONSULTS:  None    PROCEDURES:  Unless otherwise noted below, none     Procedures    FINAL IMPRESSION      1. Contusion of right forearm, initial encounter          DISPOSITION/PLAN   DISPOSITION        PATIENT REFERRED TO:  No follow-up provider specified.     DISCHARGE MEDICATIONS:  New Prescriptions    No medications on file          (Please note that portions of this note were completed with a voice recognition program.  Efforts were made to edit thedictations but occasionally words are mis-transcribed.)    Shaw Rojas MD (electronically signed)  Attending Emergency Physician        Reba Shine MD  11/15/20 7207

## 2020-11-16 NOTE — ED PROVIDER NOTES
90 lb (40.8 kg)    Height: 5' 4\" (1.626 m)            MDM      CONSULTS:  None    PROCEDURES:  Unless otherwise noted below, none     Procedures    FINAL IMPRESSION      1.  Contusion of right forearm, initial encounter        DISPOSITION/PLAN   DISPOSITION Decision To Discharge 11/15/2020 05:47:31 PM      PATIENT REFERRED TO:  [address] [place] [address] EMERGENCY DEPT  [place]  [phone]    As needed, If symptoms worsen      DISCHARGE MEDICATIONS:  Discharge Medication List as of 11/15/2020  5:47 PM             (Please note that portions of this note were completed with a voice recognitionprogram.  Efforts were made to edit the dictations but occasionally words are mis-transcribed.)    KYM Brewer (electronically signed)         KYM Brewer  11/15/20 2263

## 2020-11-17 ENCOUNTER — HOSPITAL ENCOUNTER (EMERGENCY)
Facility: HOSPITAL | Age: 60
Discharge: HOME | End: 2020-11-17
Admitting: EMERGENCY MEDICINE

## 2020-11-17 ENCOUNTER — APPOINTMENT (OUTPATIENT)
Dept: GENERAL RADIOLOGY | Facility: HOSPITAL | Age: 60
End: 2020-11-17

## 2020-11-17 VITALS
DIASTOLIC BLOOD PRESSURE: 58 MMHG | TEMPERATURE: 97.4 F | HEIGHT: 64 IN | OXYGEN SATURATION: 98 % | WEIGHT: 90 LBS | RESPIRATION RATE: 17 BRPM | HEART RATE: 72 BPM | SYSTOLIC BLOOD PRESSURE: 117 MMHG | BODY MASS INDEX: 15.36 KG/M2

## 2020-11-17 DIAGNOSIS — T14.8XXA HEMATOMA: Primary | ICD-10-CM

## 2020-11-17 PROCEDURE — 99283 EMERGENCY DEPT VISIT LOW MDM: CPT

## 2020-11-17 PROCEDURE — 73090 X-RAY EXAM OF FOREARM: CPT

## 2020-11-17 NOTE — ED PROVIDER NOTES
"Subjective     History provided by:  Patient   used: No    Arm Pain  Location:  Hematoma to rt FA x 2 days, has gotten smaller in size but now has bruising and swelling to hand, no known injury, rt hand dominant  Quality:  Pt seen at [facility] on 11/15/2020, had normal Xray, dx with hematoma  Severity:  Mild  Onset quality:  Sudden  Duration:  2 days  Timing:  Constant  Progression:  Partially resolved  Chronicity:  New  Context:  Pt requesting \"second opinion\"  Associated symptoms: no abdominal pain, no chest pain, no congestion, no cough, no diarrhea, no ear pain, no fatigue, no fever, no headaches, no loss of consciousness, no myalgias, no nausea, no rash, no rhinorrhea, no shortness of breath, no sore throat, no vomiting and no wheezing        Review of Systems   Constitutional: Negative for fatigue and fever.   HENT: Negative for congestion, ear pain, rhinorrhea and sore throat.    Respiratory: Negative for cough, shortness of breath and wheezing.    Cardiovascular: Negative for chest pain.   Gastrointestinal: Negative for abdominal pain, diarrhea, nausea and vomiting.   Musculoskeletal: Negative for myalgias.   Skin: Negative for rash.   Neurological: Negative for loss of consciousness and headaches.   All other systems reviewed and are negative.      Past Medical History:   Diagnosis Date   • Pneumonia    • Torn rotator cuff        No Known Allergies    Past Surgical History:   Procedure Laterality Date   •  SECTION     • HYSTERECTOMY         Family History   Problem Relation Age of Onset   • Cancer Mother    • Heart disease Father        Social History     Socioeconomic History   • Marital status:      Spouse name: Not on file   • Number of children: Not on file   • Years of education: Not on file   • Highest education level: Not on file   Tobacco Use   • Smoking status: Former Smoker     Quit date: 2013     Years since quittin.9   • Smokeless tobacco: Never Used "   Substance and Sexual Activity   • Alcohol use: No   • Drug use: No   • Sexual activity: Defer           Objective   Physical Exam  Vitals signs and nursing note reviewed.   Constitutional:       Appearance: Normal appearance.   HENT:      Head: Normocephalic and atraumatic.   Eyes:      Conjunctiva/sclera: Conjunctivae normal.   Cardiovascular:      Rate and Rhythm: Normal rate.   Pulmonary:      Effort: Pulmonary effort is normal.   Musculoskeletal:        Arms:    Skin:     Capillary Refill: Capillary refill takes less than 2 seconds.   Neurological:      General: No focal deficit present.      Mental Status: She is alert.   Psychiatric:         Mood and Affect: Mood normal.         Procedures           ED Course  ED Course as of Nov 17 1338   Tue Nov 17, 2020   1337 The patient's x-ray shows no acute findings.  An Ace wrap will be placed over the hematoma.  She is advised to follow-up with her primary care provider for further evaluation.  She is advised to return the ER for any new or worsening symptoms.  Patient be discharged home at this time stable condition.    [LF]      ED Course User Index  [LF] Kitty Ewing APRN                                   XR Forearm 2 View Right   Final Result   Soft tissue swelling dorsum mid right forearm   This report was finalized on 11/17/2020 13:14 by Dr. Jose Barrett MD.        Labs Reviewed - No data to display          MDM  Number of Diagnoses or Management Options  Hematoma: established and improving     Amount and/or Complexity of Data Reviewed  Tests in the radiology section of CPT®: ordered and reviewed    Patient Progress  Patient progress: stable      Final diagnoses:   Hematoma            Kitty Ewing APRN  11/17/20 5972

## 2020-11-17 NOTE — DISCHARGE INSTRUCTIONS
Follow up with one of the [facility] physician groups below to setup primary care. If you have trouble making an appointment, please call the [facility] Nurse Line at [phone]    Dr. Stephanie Soares DO, Dr. Gareth Hurtado DO, and CINTIA Randolph  [facility]  [address] [place] [address], [address] [phone]    Dr. Kobi Jacinto MD  [facility] Internal [facility] - [address] [address], [address], [place], [place] [id number] [phone]    Dr. Flo Caban DO, Dr. Austin Leon DO,  CINTIA Fields, and CINTIA Morfin  [facility] - [address] [address], [address], [place], [place] [address] [phone]     Dr. Vicki Chaves MD, and CINTIA Barragan  [facility] [address] [address], [place], [place] [address] [phone]    Dr. David Jernigan MD and Dr. Kana Corrigan MD  [address], [address] [phone]    Dr. Monico Gardner MD  [facility]  [address] [place] [address], [address], [place], [place], [address] [phone]    Dr. Chandra Cortez MD  [facility]  [address] [place], [place], [address] [phone]

## 2021-05-05 ENCOUNTER — APPOINTMENT (OUTPATIENT)
Dept: CT IMAGING | Age: 61
End: 2021-05-05
Payer: COMMERCIAL

## 2021-05-05 ENCOUNTER — HOSPITAL ENCOUNTER (EMERGENCY)
Age: 61
Discharge: HOME | End: 2021-05-05
Payer: COMMERCIAL

## 2021-05-05 VITALS
DIASTOLIC BLOOD PRESSURE: 60 MMHG | HEIGHT: 64 IN | SYSTOLIC BLOOD PRESSURE: 109 MMHG | BODY MASS INDEX: 15.36 KG/M2 | WEIGHT: 90 LBS | RESPIRATION RATE: 18 BRPM | OXYGEN SATURATION: 94 % | HEART RATE: 70 BPM

## 2021-05-05 DIAGNOSIS — S09.90XA CLOSED HEAD INJURY, INITIAL ENCOUNTER: Primary | ICD-10-CM

## 2021-05-05 PROCEDURE — 99282 EMERGENCY DEPT VISIT SF MDM: CPT

## 2021-05-05 PROCEDURE — 70450 CT HEAD/BRAIN W/O DYE: CPT

## 2021-05-05 ASSESSMENT — PAIN SCALES - GENERAL: PAINLEVEL_OUTOF10: 9

## 2021-05-05 ASSESSMENT — PAIN DESCRIPTION - LOCATION: LOCATION: HEAD

## 2021-05-05 NOTE — ED PROVIDER NOTES
[facility]  eMERGENCY dEPARTMENT eNCOUnter      Pt Name: Laurie Dickinson  MRN: [id number]  [place] 1960  Date of evaluation: 5/5/2021  Provider: Tong Herring, [address]       Chief Complaint   Patient presents with    Fall    Head Injury     RIGHT FOREHEAD         HISTORY OF PRESENT ILLNESS   (Location/Symptom, Timing/Onset,Context/Setting, Quality, Duration, Modifying Factors, Severity)  Note limiting factors. Laurie Dickinson is a 61 y.o. female who presents to the emergency department after head injury to her right forehead yesterday. She states she tripped and fell into a metal pole hitting her head. There was no loss of consciousness. She has not had any vomiting. She has not had any change in vision or problems with mentation. Patient does have a headache and swelling to her right forehead. She does not take blood thinners    The history is provided by the patient. Fall  The accident occurred yesterday. The fall occurred while walking. She fell from a height of 3 to 5 ft. Impact surface: Metal pole. The point of impact was the head. She was ambulatory at the scene. There was no entrapment after the fall. There was no drug use involved in the accident. There was no alcohol use involved in the accident. Associated symptoms include headaches. Head Injury  Associated symptoms: headache        NursingNotes were reviewed. REVIEW OF SYSTEMS    (2-9 systems for level 4, 10 or more for level 5)     Review of Systems   Skin: Positive for wound. Neurological: Positive for headaches. Negative for syncope, facial asymmetry, speech difficulty and light-headedness. Except as noted above the remainder of the review of systems was reviewed and negative.        PAST MEDICAL HISTORY     Past Medical History:   Diagnosis Date    Acid reflux     Bronchitis     Pneumonia     pt states she has an old spont on her lung due past pneumonia         SURGICALHISTORY       Past Surgical History:   Procedure Laterality Date     SECTION      HYSTERECTOMY           CURRENT MEDICATIONS       Discharge Medication List as of 2021 12:15 PM      CONTINUE these medications which have NOT CHANGED    Details   albuterol sulfate HFA (VENTOLIN HFA) 108 (90 Base) MCG/ACT inhaler Inhale 2 puffs into the lungs 4 times daily as needed for Wheezing, Disp-1 Inhaler, R-0Print      vitamin C (ASCORBIC ACID) 500 MG tablet Take 500 mg by mouth dailyHistorical Med      ranitidine (ZANTAC) 150 MG capsule Take 150 mg by mouth 2 times dailyHistorical Med      vitamin B-12 (CYANOCOBALAMIN) 500 MCG tablet Take 500 mcg by mouth dailyHistorical Med      ipratropium-albuterol (DUONEB) 0.5-2.5 (3) MG/3ML SOLN nebulizer solution Inhale 3 mLs into the lungs every 4 hours, Disp-150 mL, R-0Print      ibuprofen (ADVIL;MOTRIN) 200 MG tablet Take 400 mg by mouth every 6 hours as needed for PainHistorical Med             ALLERGIES     Patient has no known allergies.     FAMILY HISTORY       Family History   Problem Relation Age of Onset    Cancer Mother     Heart Disease Father           SOCIAL HISTORY       Social History     Socioeconomic History    Marital status:      Spouse name: None    Number of children: None    Years of education: None    Highest education level: None   Occupational History    None   Social Needs    Financial resource strain: None    Food insecurity     Worry: None     Inability: None    Transportation needs     Medical: None     Non-medical: None   Tobacco Use    Smoking status: Current Some Day Smoker     Packs/day: 1.00     Types: Cigarettes    Smokeless tobacco: Never Used    Tobacco comment: qUIT 2020   Substance and Sexual Activity    Alcohol use: No    Drug use: No    Sexual activity: None   Lifestyle    Physical activity     Days per week: None     Minutes per session: None    Stress: None   Relationships    Social connections     Talks on phone: None     Gets together: None     Attends Yazidi service: None     Active member of club or organization: None     Attends meetings of clubs or organizations: None     Relationship status: None    Intimate partner violence     Fear of current or ex partner: None     Emotionally abused: None     Physically abused: None     Forced sexual activity: None   Other Topics Concern    None   Social History Narrative    None       SCREENINGS      @FLOW([id number])@      PHYSICAL EXAM    (up to 7 for level 4, 8 or more for level 5)     ED Triage Vitals [05/05/21 0957]   BP Temp Temp src Pulse Resp SpO2 Height Weight   109/60 -- -- 70 18 94 % 5' 4\" (1.626 m) 90 lb (40.8 kg)       Physical Exam  Vitals signs and nursing note reviewed. Constitutional:       Appearance: She is well-developed. HENT:      Head: Normocephalic. Jaw: There is normal jaw occlusion. Eyes:      General: Lids are normal. No scleral icterus. Right eye: No discharge. Left eye: No discharge. Extraocular Movements: Extraocular movements intact. Pupils: Pupils are equal, round, and reactive to light. Neck:      Musculoskeletal: Normal range of motion and neck supple. Cardiovascular:      Rate and Rhythm: Normal rate and regular rhythm. Heart sounds: Normal heart sounds. Pulmonary:      Effort: No respiratory distress. Breath sounds: Normal breath sounds. Musculoskeletal: Normal range of motion. Neurological:      Mental Status: She is alert.    Psychiatric:         Behavior: Behavior normal.         DIAGNOSTIC RESULTS     EKG: All EKG's are interpreted by the Emergency Department Physician who either signs or Co-signsthis chart in the absence of a cardiologist.        RADIOLOGY:   Non-plain filmimages such as CT, Ultrasound and MRI are read by the radiologist. Plain radiographic images are visualized and preliminarily interpreted by the emergency physician with the below findings:      Interpretation per the Radiologist below, if available at the time of this note:    CT HEAD WO CONTRAST   Final Result   No acute intracranial abnormality. No skull fracture. Right frontal scalp hematoma. Signed by Dr Renetta Johnson on 5/5/2021 11:23 AM            ED BEDSIDEULTRASOUND:   Performed by ED Physician -none    LABS:  Labs Reviewed - No data to display    All other labs were within normal range or not returned as of this dictation. EMERGENCY DEPARTMENT COURSE and DIFFERENTIALDIAGNOSIS/MDM:   Vitals:    Vitals:    05/05/21 0957   BP: 109/60   Pulse: 70   Resp: 18   SpO2: 94%   Weight: 90 lb (40.8 kg)   Height: 5' 4\" (1.626 m)           MDM      CONSULTS:  None    PROCEDURES:  Unless otherwise noted below, none     Procedures    FINAL IMPRESSION      1. Closed head injury, initial encounter        DISPOSITION/PLAN   DISPOSITION        PATIENT REFERRED TO:  Javier.   [facility] [id number] [phone]          DISCHARGE MEDICATIONS:  Discharge Medication List as of 5/5/2021 12:15 PM             (Please note that portions of this note were completed with a voice recognitionprogram.  Efforts were made to edit the dictations but occasionally words are mis-transcribed.)    Sharene Galeazzi, APRN (electronically signed)          Sharene Galeazzi, APRN  05/05/21 6625

## 2021-11-19 ENCOUNTER — HOSPITAL ENCOUNTER (EMERGENCY)
Age: 61
Discharge: HOME | End: 2021-11-19
Payer: COMMERCIAL

## 2021-11-19 VITALS
OXYGEN SATURATION: 96 % | DIASTOLIC BLOOD PRESSURE: 56 MMHG | SYSTOLIC BLOOD PRESSURE: 98 MMHG | HEART RATE: 81 BPM | BODY MASS INDEX: 14.59 KG/M2 | TEMPERATURE: 97.4 F | WEIGHT: 85 LBS | RESPIRATION RATE: 18 BRPM

## 2021-11-19 DIAGNOSIS — S61.210A LACERATION OF RIGHT INDEX FINGER W/O FOREIGN BODY W/O DAMAGE TO NAIL, INITIAL ENCOUNTER: Primary | ICD-10-CM

## 2021-11-19 PROCEDURE — 6360000002 HC RX W HCPCS: Performed by: PHYSICIAN ASSISTANT

## 2021-11-19 PROCEDURE — 90715 TDAP VACCINE 7 YRS/> IM: CPT | Performed by: PHYSICIAN ASSISTANT

## 2021-11-19 PROCEDURE — 99282 EMERGENCY DEPT VISIT SF MDM: CPT

## 2021-11-19 PROCEDURE — 90471 IMMUNIZATION ADMIN: CPT | Performed by: PHYSICIAN ASSISTANT

## 2021-11-19 RX ORDER — CEPHALEXIN 500 MG/1
500 CAPSULE ORAL 2 TIMES DAILY
Qty: 10 CAPSULE | Refills: 0 | Status: SHIPPED | OUTPATIENT
Start: 2021-11-19 | End: 2021-11-24

## 2021-11-19 RX ADMIN — TETANUS TOXOID, REDUCED DIPHTHERIA TOXOID AND ACELLULAR PERTUSSIS VACCINE, ADSORBED 0.5 ML: 5; 2.5; 8; 8; 2.5 SUSPENSION INTRAMUSCULAR at 13:04

## 2021-11-19 ASSESSMENT — ENCOUNTER SYMPTOMS
BACK PAIN: 0
EYE DISCHARGE: 0
SHORTNESS OF BREATH: 0
ABDOMINAL DISTENTION: 0
SORE THROAT: 0
APNEA: 0
COUGH: 0
PHOTOPHOBIA: 0
RHINORRHEA: 0
ABDOMINAL PAIN: 0
COLOR CHANGE: 0
NAUSEA: 0
EYE PAIN: 0

## 2021-11-19 ASSESSMENT — PAIN DESCRIPTION - LOCATION: LOCATION: HAND

## 2021-11-19 ASSESSMENT — PAIN DESCRIPTION - PAIN TYPE: TYPE: ACUTE PAIN

## 2021-11-19 ASSESSMENT — PAIN SCALES - GENERAL: PAINLEVEL_OUTOF10: 6

## 2021-11-19 NOTE — Clinical Note
Daria Cannon was seen and treated in our emergency department on 11/19/2021. She may return to work on 11/22/2021. If you have any questions or concerns, please don't hesitate to call.       JASPER Stevenson

## 2021-11-19 NOTE — ED PROVIDER NOTES
[facility] EMERGENCY DEPT  eMERGENCYdEPARTMENT eNCOUnter      Pt Name: Lori Mohamud  MRN: [id number]  [place] 1960  Date of evaluation: 11/19/2021  Provider:JASPER Da Silva    CHIEF COMPLAINT       Chief Complaint   Patient presents with    Laceration     right hand         HISTORY OF PRESENT ILLNESS  (Location/Symptom, Timing/Onset, Context/Setting, Quality, Duration, Modifying Factors, Severity.)   Lori Mohamud is a 64 y.o. female who presents to the emergency department with complaints of laceration tuft of right finger index bleeding resolved no thinners unsure of tetanus status. She caught this on fence walking to her car outside her home so she tells me. Has full ROM actively and against resistance she declines when offereing XR not a strong concern for for FB this is pretty superficial. R hand dominant. HPI    Nursing Notes were reviewed and I agree. REVIEW OF SYSTEMS    (2-9 systems for level 4, 10 or more for level 5)     Review of Systems   Constitutional: Negative for activity change, appetite change, chills and fever. HENT: Negative for congestion, postnasal drip, rhinorrhea and sore throat. Eyes: Negative for photophobia, pain, discharge and visual disturbance. Respiratory: Negative for apnea, cough and shortness of breath. Cardiovascular: Negative for chest pain and leg swelling. Gastrointestinal: Negative for abdominal distention, abdominal pain and nausea. Genitourinary: Negative for vaginal bleeding. Musculoskeletal: Negative for arthralgias, back pain, joint swelling, neck pain and neck stiffness. Skin: Positive for wound. Negative for color change and rash. Neurological: Negative for dizziness, syncope, facial asymmetry and headaches. Hematological: Negative for adenopathy. Does not bruise/bleed easily. Psychiatric/Behavioral: Negative for agitation, behavioral problems and confusion.         Except as noted above the remainder of the review of systems was reviewed and negative. PAST MEDICAL HISTORY     Past Medical History:   Diagnosis Date    Acid reflux     Bronchitis     Pneumonia     pt states she has an old spont on her lung due past pneumonia         SURGICAL HISTORY       Past Surgical History:   Procedure Laterality Date     SECTION      HYSTERECTOMY           CURRENT MEDICATIONS       Discharge Medication List as of 2021  1:00 PM      CONTINUE these medications which have NOT CHANGED    Details   albuterol sulfate HFA (VENTOLIN HFA) 108 (90 Base) MCG/ACT inhaler Inhale 2 puffs into the lungs 4 times daily as needed for Wheezing, Disp-1 Inhaler, R-0Print      ibuprofen (ADVIL;MOTRIN) 200 MG tablet Take 400 mg by mouth every 6 hours as needed for PainHistorical Med      vitamin C (ASCORBIC ACID) 500 MG tablet Take 500 mg by mouth dailyHistorical Med      ranitidine (ZANTAC) 150 MG capsule Take 150 mg by mouth 2 times dailyHistorical Med      vitamin B-12 (CYANOCOBALAMIN) 500 MCG tablet Take 500 mcg by mouth dailyHistorical Med      ipratropium-albuterol (DUONEB) 0.5-2.5 (3) MG/3ML SOLN nebulizer solution Inhale 3 mLs into the lungs every 4 hours, Disp-150 mL, R-0Print             ALLERGIES     Patient has no known allergies.     FAMILY HISTORY       Family History   Problem Relation Age of Onset    Cancer Mother     Heart Disease Father           SOCIAL HISTORY       Social History     Socioeconomic History    Marital status:      Spouse name: None    Number of children: None    Years of education: None    Highest education level: None   Occupational History    None   Tobacco Use    Smoking status: Current Some Day Smoker     Packs/day: 1.00     Types: Cigarettes    Smokeless tobacco: Never Used    Tobacco comment: qUIT 2020   Vaping Use    Vaping Use: Never used   Substance and Sexual Activity    Alcohol use: No    Drug use: No    Sexual activity: None   Other Topics Concern    None   Social History Narrative    None     Social Determinants of Health     Financial Resource Strain:     Difficulty of Paying Living Expenses: Not on file   Food Insecurity:     Worried About Running Out of Food in the Last Year: Not on file    Randy of Food in the Last Year: Not on file   Transportation Needs:     Lack of Transportation (Medical): Not on file    Lack of Transportation (Non-Medical): Not on file   Physical Activity:     Days of Exercise per Week: Not on file    Minutes of Exercise per Session: Not on file   Stress:     Feeling of Stress : Not on file   Social Connections:     Frequency of Communication with Friends and Family: Not on file    Frequency of Social Gatherings with Friends and Family: Not on file    Attends Latter day Services: Not on file    Active Member of [address] [place] [address] [employer] or Organizations: Not on file    Attends Club or Organization Meetings: Not on file    Marital Status: Not on file   Intimate Partner Violence:     Fear of Current or Ex-Partner: Not on file    Emotionally Abused: Not on file    Physically Abused: Not on file    Sexually Abused: Not on file   Housing Stability:     Unable to Pay for Housing in the Last Year: Not on file    Number of [place] in the Last Year: Not on file    Unstable Housing in the Last Year: Not on file       SCREENINGS           PHYSICAL EXAM    (up to 7 forlevel 4, 8 or more for level 5)     ED Triage Vitals   BP Temp Temp src Pulse Resp SpO2 Height Weight   11/19/21 1127 11/19/21 1123 -- 11/19/21 1123 11/19/21 1123 11/19/21 1123 -- 11/19/21 1127   (!) 98/56 97.4 °F (36.3 °C)  81 18 96 %  85 lb (38.6 kg)       Physical Exam  Vitals and nursing note reviewed. Constitutional:       General: She is not in acute distress. Appearance: Normal appearance. She is well-developed. She is not diaphoretic. HENT:      Head: Normocephalic and atraumatic.       Right Ear: Tympanic membrane, ear canal and external ear normal.      Left Ear: Tympanic membrane, ear canal and external ear normal.      Nose: Nose normal.      Mouth/Throat:      Mouth: Mucous membranes are moist.      Pharynx: No oropharyngeal exudate. Eyes:      General:         Right eye: No discharge. Left eye: No discharge. Pupils: Pupils are equal, round, and reactive to light. Neck:      Thyroid: No thyromegaly. Cardiovascular:      Rate and Rhythm: Normal rate and regular rhythm. Pulses: Normal pulses. Heart sounds: Normal heart sounds. No murmur heard. No friction rub. Pulmonary:      Effort: Pulmonary effort is normal. No respiratory distress. Breath sounds: Normal breath sounds. No stridor. No wheezing. Abdominal:      General: Abdomen is flat. Bowel sounds are normal. There is no distension. Palpations: Abdomen is soft. Tenderness: There is no abdominal tenderness. Musculoskeletal:         General: Tenderness and signs of injury present. Normal range of motion. Cervical back: Normal range of motion and neck supple. Comments: Tuft abrasion/laceration 1cm superficial dermal layer about distal index R   Skin:     General: Skin is warm and dry. Capillary Refill: Capillary refill takes less than 2 seconds. Findings: No rash. Neurological:      General: No focal deficit present. Mental Status: She is alert and oriented to person, place, and time. Mental status is at baseline. Cranial Nerves: No cranial nerve deficit. Sensory: No sensory deficit. Coordination: Coordination normal.   Psychiatric:         Mood and Affect: Mood normal.         Behavior: Behavior normal.         Thought Content:  Thought content normal.         Judgment: Judgment normal.           DIAGNOSTIC RESULTS     RADIOLOGY:   Non-plain film images such as CT, Ultrasound and MRI are read by the radiologist. Plain radiographic images are visualized and preliminarilyinterpreted by No att. providers found with the below findings:      Interpretation per the Radiologist below, if available at the time of this note:    No orders to display       LABS:  Labs Reviewed - No data to display    All other labs were within normal range or notreturned as of this dictation. RE-ASSESSMENT        EMERGENCY DEPARTMENT COURSE and DIFFERENTIAL DIAGNOSIS/MDM:   Vitals:    Vitals:    11/19/21 1123 11/19/21 1127   BP:  (!) 98/56   Pulse: 81    Resp: 18    Temp: 97.4 °F (36.3 °C)    SpO2: 96%    Weight:  85 lb (38.6 kg)       MDM  After cleaning off this patient's wound its more of a cut that it would be difficult to pull together based on it being a tuft type laceration the plan will be to allow to heal by secondary intention plan will be for antibiotics and we will monitor that at home for infection. She has follow-up with Dr. Negrita Traylor the hand specialist to do a wound check. There is no concerns at this time for foreign body she has full range of motion did not feel that a x-ray was indicated for this patient as it is a superficial tuft cut. Additionally we will update her tetanus that she is unsure of status and we do not have it on file. She is baseline hypotensive she informed me that is her norm she is very petite I think that is probably where she lives have made her aware of that to watch with her PCP plan will be for discharge. PROCEDURES:    Procedures      FINAL IMPRESSION      1.  Laceration of right index finger w/o foreign body w/o damage to nail, initial encounter          DISPOSITION/PLAN   DISPOSITION Decision To Discharge 11/19/2021 12:47:59 PM      PATIENT REFERRED TO:  [facility] EMERGENCY DEPT  [place]  [phone]    If symptoms worsen    Illa Spurling, MD  [address] [place] [address]  [phone]    Schedule an appointment as soon as possible for a visit   For wound re-check      DISCHARGE MEDICATIONS:  Discharge Medication List as of 11/19/2021  1:00 PM      START taking these

## 2021-12-04 ENCOUNTER — APPOINTMENT (OUTPATIENT)
Dept: GENERAL RADIOLOGY | Age: 61
DRG: 177 | End: 2021-12-04
Payer: COMMERCIAL

## 2021-12-04 ENCOUNTER — HOSPITAL ENCOUNTER (INPATIENT)
Age: 61
LOS: 3 days | Discharge: HOME HEALTH | DRG: 177 | End: 2021-12-07
Attending: EMERGENCY MEDICINE | Admitting: INTERNAL MEDICINE
Payer: COMMERCIAL

## 2021-12-04 ENCOUNTER — APPOINTMENT (OUTPATIENT)
Dept: CT IMAGING | Age: 61
DRG: 177 | End: 2021-12-04
Payer: COMMERCIAL

## 2021-12-04 DIAGNOSIS — U07.1 ACUTE HYPOXEMIC RESPIRATORY FAILURE DUE TO COVID-19 (HCC): Primary | ICD-10-CM

## 2021-12-04 DIAGNOSIS — I95.89 HYPOTENSION DUE TO HYPOVOLEMIA: ICD-10-CM

## 2021-12-04 DIAGNOSIS — E86.1 HYPOTENSION DUE TO HYPOVOLEMIA: ICD-10-CM

## 2021-12-04 DIAGNOSIS — J96.01 ACUTE HYPOXEMIC RESPIRATORY FAILURE DUE TO COVID-19 (HCC): Primary | ICD-10-CM

## 2021-12-04 DIAGNOSIS — J44.9 CHRONIC OBSTRUCTIVE PULMONARY DISEASE, UNSPECIFIED COPD TYPE (HCC): ICD-10-CM

## 2021-12-04 PROBLEM — E87.1 HYPONATREMIA: Status: ACTIVE | Noted: 2021-12-04

## 2021-12-04 LAB
ADENOVIRUS BY PCR: NOT DETECTED
ALBUMIN SERPL-MCNC: 3.7 G/DL (ref 3.5–5.2)
ALP BLD-CCNC: 58 U/L (ref 35–104)
ALT SERPL-CCNC: 18 U/L (ref 5–33)
ANION GAP SERPL CALCULATED.3IONS-SCNC: 10 MMOL/L (ref 7–19)
AST SERPL-CCNC: 34 U/L (ref 5–32)
BASOPHILS ABSOLUTE: 0 K/UL (ref 0–0.2)
BASOPHILS RELATIVE PERCENT: 0.2 % (ref 0–1)
BILIRUB SERPL-MCNC: 0.3 MG/DL (ref 0.2–1.2)
BORDETELLA PARAPERTUSSIS BY PCR: NOT DETECTED
BORDETELLA PERTUSSIS BY PCR: NOT DETECTED
BUN BLDV-MCNC: 26 MG/DL (ref 8–23)
CALCIUM SERPL-MCNC: 9.3 MG/DL (ref 8.8–10.2)
CHLAMYDOPHILIA PNEUMONIAE BY PCR: NOT DETECTED
CHLORIDE BLD-SCNC: 93 MMOL/L (ref 98–111)
CO2: 27 MMOL/L (ref 22–29)
CORONAVIRUS 229E BY PCR: NOT DETECTED
CORONAVIRUS HKU1 BY PCR: NOT DETECTED
CORONAVIRUS NL63 BY PCR: NOT DETECTED
CORONAVIRUS OC43 BY PCR: NOT DETECTED
CREAT SERPL-MCNC: 0.8 MG/DL (ref 0.5–0.9)
EOSINOPHILS ABSOLUTE: 0 K/UL (ref 0–0.6)
EOSINOPHILS RELATIVE PERCENT: 0 % (ref 0–5)
GFR AFRICAN AMERICAN: >59
GFR NON-AFRICAN AMERICAN: >60
GLUCOSE BLD-MCNC: 148 MG/DL (ref 74–109)
HCT VFR BLD CALC: 45.7 % (ref 37–47)
HEMOGLOBIN: 14.6 G/DL (ref 12–16)
HUMAN METAPNEUMOVIRUS BY PCR: NOT DETECTED
HUMAN RHINOVIRUS/ENTEROVIRUS BY PCR: NOT DETECTED
IMMATURE GRANULOCYTES #: 0 K/UL
INFLUENZA A BY PCR: NOT DETECTED
INFLUENZA B BY PCR: NOT DETECTED
INR BLD: 1.02 (ref 0.88–1.18)
LACTIC ACID: 1.3 MMOL/L (ref 0.5–1.9)
LIPASE: 87 U/L (ref 13–60)
LYMPHOCYTES ABSOLUTE: 0.5 K/UL (ref 1.1–4.5)
LYMPHOCYTES RELATIVE PERCENT: 6.4 % (ref 20–40)
MCH RBC QN AUTO: 28.5 PG (ref 27–31)
MCHC RBC AUTO-ENTMCNC: 31.9 G/DL (ref 33–37)
MCV RBC AUTO: 89.3 FL (ref 81–99)
MONOCYTES ABSOLUTE: 0.4 K/UL (ref 0–0.9)
MONOCYTES RELATIVE PERCENT: 4.8 % (ref 0–10)
MYCOPLASMA PNEUMONIAE BY PCR: NOT DETECTED
NEUTROPHILS ABSOLUTE: 7.1 K/UL (ref 1.5–7.5)
NEUTROPHILS RELATIVE PERCENT: 88.1 % (ref 50–65)
PARAINFLUENZA VIRUS 1 BY PCR: NOT DETECTED
PARAINFLUENZA VIRUS 2 BY PCR: NOT DETECTED
PARAINFLUENZA VIRUS 3 BY PCR: DETECTED
PARAINFLUENZA VIRUS 4 BY PCR: NOT DETECTED
PDW BLD-RTO: 12.4 % (ref 11.5–14.5)
PLATELET # BLD: 131 K/UL (ref 130–400)
PMV BLD AUTO: 10.3 FL (ref 9.4–12.3)
POTASSIUM SERPL-SCNC: 4 MMOL/L (ref 3.5–5)
PROTHROMBIN TIME: 13.6 SEC (ref 12–14.6)
RBC # BLD: 5.12 M/UL (ref 4.2–5.4)
RESPIRATORY SYNCYTIAL VIRUS BY PCR: NOT DETECTED
SARS-COV-2, PCR: DETECTED
SODIUM BLD-SCNC: 130 MMOL/L (ref 136–145)
TOTAL PROTEIN: 6.7 G/DL (ref 6.6–8.7)
TROPONIN: <0.01 NG/ML (ref 0–0.03)
WBC # BLD: 8.1 K/UL (ref 4.8–10.8)

## 2021-12-04 PROCEDURE — 2700000000 HC OXYGEN THERAPY PER DAY

## 2021-12-04 PROCEDURE — 70450 CT HEAD/BRAIN W/O DYE: CPT

## 2021-12-04 PROCEDURE — 8E0ZXY6 ISOLATION: ICD-10-PCS | Performed by: INTERNAL MEDICINE

## 2021-12-04 PROCEDURE — 0202U NFCT DS 22 TRGT SARS-COV-2: CPT

## 2021-12-04 PROCEDURE — 6360000002 HC RX W HCPCS

## 2021-12-04 PROCEDURE — 84484 ASSAY OF TROPONIN QUANT: CPT

## 2021-12-04 PROCEDURE — 2060000000 HC ICU INTERMEDIATE R&B

## 2021-12-04 PROCEDURE — 71045 X-RAY EXAM CHEST 1 VIEW: CPT

## 2021-12-04 PROCEDURE — 96360 HYDRATION IV INFUSION INIT: CPT

## 2021-12-04 PROCEDURE — 6370000000 HC RX 637 (ALT 250 FOR IP)

## 2021-12-04 PROCEDURE — 83690 ASSAY OF LIPASE: CPT

## 2021-12-04 PROCEDURE — 84145 PROCALCITONIN (PCT): CPT

## 2021-12-04 PROCEDURE — 2580000003 HC RX 258: Performed by: EMERGENCY MEDICINE

## 2021-12-04 PROCEDURE — 99283 EMERGENCY DEPT VISIT LOW MDM: CPT

## 2021-12-04 PROCEDURE — 87040 BLOOD CULTURE FOR BACTERIA: CPT

## 2021-12-04 PROCEDURE — 2580000003 HC RX 258

## 2021-12-04 PROCEDURE — 80053 COMPREHEN METABOLIC PANEL: CPT

## 2021-12-04 PROCEDURE — 93005 ELECTROCARDIOGRAM TRACING: CPT | Performed by: EMERGENCY MEDICINE

## 2021-12-04 PROCEDURE — 85025 COMPLETE CBC W/AUTO DIFF WBC: CPT

## 2021-12-04 PROCEDURE — 36415 COLL VENOUS BLD VENIPUNCTURE: CPT

## 2021-12-04 PROCEDURE — 83605 ASSAY OF LACTIC ACID: CPT

## 2021-12-04 PROCEDURE — 85610 PROTHROMBIN TIME: CPT

## 2021-12-04 RX ORDER — ZINC SULFATE 50(220)MG
50 CAPSULE ORAL DAILY
Status: DISCONTINUED | OUTPATIENT
Start: 2021-12-05 | End: 2021-12-07 | Stop reason: HOSPADM

## 2021-12-04 RX ORDER — SODIUM CHLORIDE 9 MG/ML
25 INJECTION, SOLUTION INTRAVENOUS PRN
Status: DISCONTINUED | OUTPATIENT
Start: 2021-12-04 | End: 2021-12-07 | Stop reason: HOSPADM

## 2021-12-04 RX ORDER — SODIUM CHLORIDE 0.9 % (FLUSH) 0.9 %
5-40 SYRINGE (ML) INJECTION EVERY 12 HOURS SCHEDULED
Status: DISCONTINUED | OUTPATIENT
Start: 2021-12-04 | End: 2021-12-07 | Stop reason: HOSPADM

## 2021-12-04 RX ORDER — SODIUM CHLORIDE, SODIUM LACTATE, POTASSIUM CHLORIDE, AND CALCIUM CHLORIDE .6; .31; .03; .02 G/100ML; G/100ML; G/100ML; G/100ML
1000 INJECTION, SOLUTION INTRAVENOUS ONCE
Status: COMPLETED | OUTPATIENT
Start: 2021-12-04 | End: 2021-12-04

## 2021-12-04 RX ORDER — ALBUTEROL SULFATE 90 UG/1
2 AEROSOL, METERED RESPIRATORY (INHALATION) 4 TIMES DAILY
Status: DISCONTINUED | OUTPATIENT
Start: 2021-12-04 | End: 2021-12-07 | Stop reason: HOSPADM

## 2021-12-04 RX ORDER — ONDANSETRON 4 MG/1
4 TABLET, ORALLY DISINTEGRATING ORAL EVERY 8 HOURS PRN
Status: DISCONTINUED | OUTPATIENT
Start: 2021-12-04 | End: 2021-12-07 | Stop reason: HOSPADM

## 2021-12-04 RX ORDER — MECOBALAMIN 5000 MCG
5 TABLET,DISINTEGRATING ORAL NIGHTLY
Status: DISCONTINUED | OUTPATIENT
Start: 2021-12-04 | End: 2021-12-07 | Stop reason: HOSPADM

## 2021-12-04 RX ORDER — CHOLECALCIFEROL (VITAMIN D3) 125 MCG
500 CAPSULE ORAL DAILY
Status: DISCONTINUED | OUTPATIENT
Start: 2021-12-05 | End: 2021-12-07 | Stop reason: HOSPADM

## 2021-12-04 RX ORDER — GUAIFENESIN/DEXTROMETHORPHAN 100-10MG/5
5 SYRUP ORAL EVERY 4 HOURS PRN
Status: DISCONTINUED | OUTPATIENT
Start: 2021-12-04 | End: 2021-12-07 | Stop reason: HOSPADM

## 2021-12-04 RX ORDER — ONDANSETRON 2 MG/ML
4 INJECTION INTRAMUSCULAR; INTRAVENOUS EVERY 6 HOURS PRN
Status: DISCONTINUED | OUTPATIENT
Start: 2021-12-04 | End: 2021-12-07 | Stop reason: HOSPADM

## 2021-12-04 RX ORDER — FAMOTIDINE 20 MG/1
20 TABLET, FILM COATED ORAL 2 TIMES DAILY
Status: DISCONTINUED | OUTPATIENT
Start: 2021-12-04 | End: 2021-12-07 | Stop reason: HOSPADM

## 2021-12-04 RX ORDER — ACETAMINOPHEN 650 MG/1
650 SUPPOSITORY RECTAL EVERY 6 HOURS PRN
Status: DISCONTINUED | OUTPATIENT
Start: 2021-12-04 | End: 2021-12-07 | Stop reason: HOSPADM

## 2021-12-04 RX ORDER — DEXAMETHASONE SODIUM PHOSPHATE 10 MG/ML
6 INJECTION, SOLUTION INTRAMUSCULAR; INTRAVENOUS DAILY
Status: DISCONTINUED | OUTPATIENT
Start: 2021-12-04 | End: 2021-12-07 | Stop reason: HOSPADM

## 2021-12-04 RX ORDER — IPRATROPIUM BROMIDE AND ALBUTEROL SULFATE 2.5; .5 MG/3ML; MG/3ML
1 SOLUTION RESPIRATORY (INHALATION)
Status: DISCONTINUED | OUTPATIENT
Start: 2021-12-05 | End: 2021-12-04 | Stop reason: CLARIF

## 2021-12-04 RX ORDER — VITAMIN B COMPLEX
2000 TABLET ORAL DAILY
Status: DISCONTINUED | OUTPATIENT
Start: 2021-12-05 | End: 2021-12-05

## 2021-12-04 RX ORDER — DEXAMETHASONE SODIUM PHOSPHATE 10 MG/ML
6 INJECTION, SOLUTION INTRAMUSCULAR; INTRAVENOUS ONCE
Status: DISCONTINUED | OUTPATIENT
Start: 2021-12-04 | End: 2021-12-04 | Stop reason: SDUPTHER

## 2021-12-04 RX ORDER — ASCORBIC ACID 500 MG
500 TABLET ORAL DAILY
Status: DISCONTINUED | OUTPATIENT
Start: 2021-12-05 | End: 2021-12-07 | Stop reason: HOSPADM

## 2021-12-04 RX ORDER — ACETAMINOPHEN 325 MG/1
650 TABLET ORAL EVERY 6 HOURS PRN
Status: DISCONTINUED | OUTPATIENT
Start: 2021-12-04 | End: 2021-12-07 | Stop reason: HOSPADM

## 2021-12-04 RX ORDER — POLYETHYLENE GLYCOL 3350 17 G/17G
17 POWDER, FOR SOLUTION ORAL DAILY PRN
Status: DISCONTINUED | OUTPATIENT
Start: 2021-12-04 | End: 2021-12-07 | Stop reason: HOSPADM

## 2021-12-04 RX ORDER — SODIUM CHLORIDE 9 MG/ML
INJECTION, SOLUTION INTRAVENOUS CONTINUOUS
Status: DISCONTINUED | OUTPATIENT
Start: 2021-12-04 | End: 2021-12-07 | Stop reason: HOSPADM

## 2021-12-04 RX ORDER — SODIUM CHLORIDE 0.9 % (FLUSH) 0.9 %
5-40 SYRINGE (ML) INJECTION PRN
Status: DISCONTINUED | OUTPATIENT
Start: 2021-12-04 | End: 2021-12-07 | Stop reason: HOSPADM

## 2021-12-04 RX ORDER — IBUPROFEN 400 MG/1
400 TABLET ORAL EVERY 6 HOURS PRN
Status: DISCONTINUED | OUTPATIENT
Start: 2021-12-04 | End: 2021-12-07 | Stop reason: HOSPADM

## 2021-12-04 RX ADMIN — SODIUM CHLORIDE: 9 INJECTION, SOLUTION INTRAVENOUS at 22:26

## 2021-12-04 RX ADMIN — ALBUTEROL SULFATE 2 PUFF: 90 AEROSOL, METERED RESPIRATORY (INHALATION) at 22:37

## 2021-12-04 RX ADMIN — IPRATROPIUM BROMIDE 2 PUFF: 17 AEROSOL, METERED RESPIRATORY (INHALATION) at 22:37

## 2021-12-04 RX ADMIN — ENOXAPARIN SODIUM 30 MG: 100 INJECTION SUBCUTANEOUS at 22:27

## 2021-12-04 RX ADMIN — SODIUM CHLORIDE, PRESERVATIVE FREE 10 ML: 5 INJECTION INTRAVENOUS at 22:26

## 2021-12-04 RX ADMIN — SODIUM CHLORIDE, POTASSIUM CHLORIDE, SODIUM LACTATE AND CALCIUM CHLORIDE 1000 ML: 600; 310; 30; 20 INJECTION, SOLUTION INTRAVENOUS at 19:18

## 2021-12-04 RX ADMIN — IBUPROFEN 400 MG: 400 TABLET, FILM COATED ORAL at 22:27

## 2021-12-04 RX ADMIN — FAMOTIDINE 20 MG: 20 TABLET ORAL at 22:27

## 2021-12-04 RX ADMIN — DEXAMETHASONE SODIUM PHOSPHATE 6 MG: 10 INJECTION INTRAMUSCULAR; INTRAVENOUS at 22:26

## 2021-12-04 RX ADMIN — Medication 5 MG: at 22:27

## 2021-12-04 ASSESSMENT — ENCOUNTER SYMPTOMS
SHORTNESS OF BREATH: 1
NAUSEA: 1
DIARRHEA: 1

## 2021-12-04 ASSESSMENT — PAIN SCALES - GENERAL
PAINLEVEL_OUTOF10: 3
PAINLEVEL_OUTOF10: 10
PAINLEVEL_OUTOF10: 0

## 2021-12-05 PROBLEM — R63.4 UNEXPLAINED WEIGHT LOSS: Status: ACTIVE | Noted: 2021-12-05

## 2021-12-05 PROBLEM — J96.01 ACUTE HYPOXEMIC RESPIRATORY FAILURE DUE TO COVID-19 (HCC): Status: RESOLVED | Noted: 2021-12-04 | Resolved: 2021-12-05

## 2021-12-05 PROBLEM — E87.1 HYPONATREMIA: Status: RESOLVED | Noted: 2021-12-04 | Resolved: 2021-12-05

## 2021-12-05 PROBLEM — E55.9 VITAMIN D DEFICIENCY: Status: ACTIVE | Noted: 2021-12-05

## 2021-12-05 PROBLEM — U07.1 ACUTE HYPOXEMIC RESPIRATORY FAILURE DUE TO COVID-19 (HCC): Status: RESOLVED | Noted: 2021-12-04 | Resolved: 2021-12-05

## 2021-12-05 PROBLEM — J32.9 CHRONIC SINUSITIS: Status: ACTIVE | Noted: 2021-12-05

## 2021-12-05 PROBLEM — U07.1 COVID-19: Status: ACTIVE | Noted: 2021-12-05

## 2021-12-05 PROBLEM — K21.9 ACID REFLUX: Status: ACTIVE | Noted: 2021-12-05

## 2021-12-05 PROBLEM — R61 CHRONIC NIGHT SWEATS: Status: ACTIVE | Noted: 2021-12-05

## 2021-12-05 LAB
ANION GAP SERPL CALCULATED.3IONS-SCNC: 12 MMOL/L (ref 7–19)
BACTERIA: NEGATIVE /HPF
BILIRUBIN URINE: NEGATIVE
BLOOD, URINE: NEGATIVE
BUN BLDV-MCNC: 24 MG/DL (ref 8–23)
C-REACTIVE PROTEIN: 4.08 MG/DL (ref 0–0.5)
CALCIUM SERPL-MCNC: 8.7 MG/DL (ref 8.8–10.2)
CHLORIDE BLD-SCNC: 100 MMOL/L (ref 98–111)
CLARITY: CLEAR
CO2: 24 MMOL/L (ref 22–29)
COLOR: YELLOW
CREAT SERPL-MCNC: 0.5 MG/DL (ref 0.5–0.9)
CRYSTALS, UA: ABNORMAL /HPF
D DIMER: 0.71 UG/ML FEU (ref 0–0.48)
EPITHELIAL CELLS, UA: 2 /HPF (ref 0–5)
GFR AFRICAN AMERICAN: >59
GFR NON-AFRICAN AMERICAN: >60
GLUCOSE BLD-MCNC: 127 MG/DL (ref 74–109)
GLUCOSE URINE: 500 MG/DL
HCT VFR BLD CALC: 43 % (ref 37–47)
HEMOGLOBIN: 13.5 G/DL (ref 12–16)
HYALINE CASTS: 10 /HPF (ref 0–8)
KETONES, URINE: ABNORMAL MG/DL
LEUKOCYTE ESTERASE, URINE: NEGATIVE
MAGNESIUM: 2.1 MG/DL (ref 1.6–2.4)
MCH RBC QN AUTO: 28.3 PG (ref 27–31)
MCHC RBC AUTO-ENTMCNC: 31.4 G/DL (ref 33–37)
MCV RBC AUTO: 90.1 FL (ref 81–99)
NITRITE, URINE: NEGATIVE
PDW BLD-RTO: 12.5 % (ref 11.5–14.5)
PH UA: 6 (ref 5–8)
PHOSPHORUS: 3.5 MG/DL (ref 2.5–4.5)
PLATELET # BLD: 130 K/UL (ref 130–400)
PMV BLD AUTO: 10 FL (ref 9.4–12.3)
POTASSIUM REFLEX MAGNESIUM: 4 MMOL/L (ref 3.5–5)
PROCALCITONIN: 0.1 NG/ML (ref 0–0.09)
PROTEIN UA: 30 MG/DL
RBC # BLD: 4.77 M/UL (ref 4.2–5.4)
RBC UA: 1 /HPF (ref 0–4)
SODIUM BLD-SCNC: 136 MMOL/L (ref 136–145)
SPECIFIC GRAVITY UA: 1.03 (ref 1–1.03)
UROBILINOGEN, URINE: 1 E.U./DL
VITAMIN D 25-HYDROXY: 12.6 NG/ML
WBC # BLD: 6.6 K/UL (ref 4.8–10.8)
WBC UA: 2 /HPF (ref 0–5)

## 2021-12-05 PROCEDURE — 86140 C-REACTIVE PROTEIN: CPT

## 2021-12-05 PROCEDURE — 6360000002 HC RX W HCPCS

## 2021-12-05 PROCEDURE — 83735 ASSAY OF MAGNESIUM: CPT

## 2021-12-05 PROCEDURE — 36415 COLL VENOUS BLD VENIPUNCTURE: CPT

## 2021-12-05 PROCEDURE — 6370000000 HC RX 637 (ALT 250 FOR IP): Performed by: HOSPITALIST

## 2021-12-05 PROCEDURE — 85027 COMPLETE CBC AUTOMATED: CPT

## 2021-12-05 PROCEDURE — 1210000000 HC MED SURG R&B

## 2021-12-05 PROCEDURE — 2700000000 HC OXYGEN THERAPY PER DAY

## 2021-12-05 PROCEDURE — 6370000000 HC RX 637 (ALT 250 FOR IP)

## 2021-12-05 PROCEDURE — 2580000003 HC RX 258

## 2021-12-05 PROCEDURE — XW0DXM6 INTRODUCTION OF BARICITINIB INTO MOUTH AND PHARYNX, EXTERNAL APPROACH, NEW TECHNOLOGY GROUP 6: ICD-10-PCS | Performed by: HOSPITALIST

## 2021-12-05 PROCEDURE — 85379 FIBRIN DEGRADATION QUANT: CPT

## 2021-12-05 PROCEDURE — 80048 BASIC METABOLIC PNL TOTAL CA: CPT

## 2021-12-05 PROCEDURE — 82306 VITAMIN D 25 HYDROXY: CPT

## 2021-12-05 PROCEDURE — 84100 ASSAY OF PHOSPHORUS: CPT

## 2021-12-05 PROCEDURE — 81001 URINALYSIS AUTO W/SCOPE: CPT

## 2021-12-05 RX ORDER — ERGOCALCIFEROL 1.25 MG/1
50000 CAPSULE ORAL WEEKLY
Status: DISCONTINUED | OUTPATIENT
Start: 2021-12-05 | End: 2021-12-07 | Stop reason: HOSPADM

## 2021-12-05 RX ORDER — BUDESONIDE AND FORMOTEROL FUMARATE DIHYDRATE 160; 4.5 UG/1; UG/1
2 AEROSOL RESPIRATORY (INHALATION) 2 TIMES DAILY
Status: DISCONTINUED | OUTPATIENT
Start: 2021-12-05 | End: 2021-12-07 | Stop reason: HOSPADM

## 2021-12-05 RX ADMIN — ALBUTEROL SULFATE 2 PUFF: 90 AEROSOL, METERED RESPIRATORY (INHALATION) at 16:58

## 2021-12-05 RX ADMIN — BARICITINIB 4 MG: 2 TABLET, FILM COATED ORAL at 12:28

## 2021-12-05 RX ADMIN — ERGOCALCIFEROL 50000 UNITS: 1.25 CAPSULE ORAL at 14:11

## 2021-12-05 RX ADMIN — Medication 5 MG: at 21:05

## 2021-12-05 RX ADMIN — SODIUM CHLORIDE, PRESERVATIVE FREE 10 ML: 5 INJECTION INTRAVENOUS at 21:05

## 2021-12-05 RX ADMIN — CYANOCOBALAMIN TAB 500 MCG 500 MCG: 500 TAB at 10:17

## 2021-12-05 RX ADMIN — ALBUTEROL SULFATE 2 PUFF: 90 AEROSOL, METERED RESPIRATORY (INHALATION) at 21:09

## 2021-12-05 RX ADMIN — ENOXAPARIN SODIUM 30 MG: 100 INJECTION SUBCUTANEOUS at 10:17

## 2021-12-05 RX ADMIN — DEXAMETHASONE SODIUM PHOSPHATE 6 MG: 10 INJECTION INTRAMUSCULAR; INTRAVENOUS at 16:57

## 2021-12-05 RX ADMIN — ALBUTEROL SULFATE 2 PUFF: 90 AEROSOL, METERED RESPIRATORY (INHALATION) at 10:15

## 2021-12-05 RX ADMIN — BUDESONIDE AND FORMOTEROL FUMARATE DIHYDRATE 2 PUFF: 160; 4.5 AEROSOL RESPIRATORY (INHALATION) at 21:09

## 2021-12-05 RX ADMIN — ALBUTEROL SULFATE 2 PUFF: 90 AEROSOL, METERED RESPIRATORY (INHALATION) at 12:28

## 2021-12-05 RX ADMIN — IPRATROPIUM BROMIDE 2 PUFF: 17 AEROSOL, METERED RESPIRATORY (INHALATION) at 21:09

## 2021-12-05 RX ADMIN — IPRATROPIUM BROMIDE 2 PUFF: 17 AEROSOL, METERED RESPIRATORY (INHALATION) at 10:16

## 2021-12-05 RX ADMIN — FAMOTIDINE 20 MG: 20 TABLET ORAL at 21:05

## 2021-12-05 RX ADMIN — IPRATROPIUM BROMIDE 2 PUFF: 17 AEROSOL, METERED RESPIRATORY (INHALATION) at 16:57

## 2021-12-05 RX ADMIN — FAMOTIDINE 20 MG: 20 TABLET ORAL at 10:17

## 2021-12-05 RX ADMIN — ACETAMINOPHEN 650 MG: 325 TABLET ORAL at 21:05

## 2021-12-05 RX ADMIN — OXYCODONE HYDROCHLORIDE AND ACETAMINOPHEN 500 MG: 500 TABLET ORAL at 10:17

## 2021-12-05 RX ADMIN — BUDESONIDE AND FORMOTEROL FUMARATE DIHYDRATE 2 PUFF: 160; 4.5 AEROSOL RESPIRATORY (INHALATION) at 10:17

## 2021-12-05 RX ADMIN — ZINC SULFATE 220 MG (50 MG) CAPSULE 50 MG: CAPSULE at 10:17

## 2021-12-05 RX ADMIN — Medication 2000 UNITS: at 10:17

## 2021-12-05 RX ADMIN — ENOXAPARIN SODIUM 30 MG: 100 INJECTION SUBCUTANEOUS at 21:05

## 2021-12-05 RX ADMIN — IPRATROPIUM BROMIDE 2 PUFF: 17 AEROSOL, METERED RESPIRATORY (INHALATION) at 12:28

## 2021-12-05 RX ADMIN — SODIUM CHLORIDE, PRESERVATIVE FREE 10 ML: 5 INJECTION INTRAVENOUS at 10:16

## 2021-12-05 ASSESSMENT — PAIN SCALES - GENERAL: PAINLEVEL_OUTOF10: 3

## 2021-12-05 NOTE — PROGRESS NOTES
Pharmacy Consult      Tisha Zuñiga is a 64 y.o. female for whom pharmacy has been consulted to dose baricitinib. Patient Active Problem List   Diagnosis    Acute hypoxemic respiratory failure due to COVID-19 [facility])    Hyponatremia       Allergies:  Lortab [hydrocodone-acetaminophen]     Ht/Wt:   Ht Readings from Last 1 Encounters:   12/04/21 5' 4\" (1.626 m)        Wt Readings from Last 1 Encounters:   12/04/21 82 lb 7 oz (37.4 kg)         Recent Labs     12/04/21  1902 12/05/21  0341   LABGLOM >60 >60   GFRAA >59 >59   LYMPHSABS 0.5*  --    NEUTROABS 7.1  --    ALT 18  --    AST 34*  --            Assessment/Plan:    Start patient on baricitinib 4 mg daily for 14 days of total treatment or until hospital discharge, whichever is first. Pharmacy will continue to follow GFR, ALC, ANC and aminotransferases. Thank you for the consult.      KIM WASSERMAN, PHARM D, 12/5/2021, 11:42 AM

## 2021-12-05 NOTE — PROGRESS NOTES
[place], [place], [address]    DEPARTMENT OF HOSPITALIST MEDICINE      PROGRESS  NOTE:    NOTE: Portions of this note have been copied forward, however, changed to reflect the most current clinical status of this patient. Patient:  Livier Reese  YOB: 1960  Date of Service: 12/5/2021  MRN: [id number]   Acct: [de-identified]   Primary Care Physician: . None (Inactive)  Advance Directive: Full Code  Admit Date: 12/4/2021       Hospital Day: 1      CHIEF COMPLAINT:  Chief Complaint   Patient presents with    Headache     X 4 DAYS.  Fever    Diarrhea       SUBJECTIVE:  I saw and evaluated the patient at the bedside. She is feeling a little better today. Shortness of breath is slowly improving      CUMULATIVE  HOSPITAL  COURSE:    12/5/2021  Patient responding well to oxygen via nasal cannula and maintaining O2 sats in 90s. He is an ex-smoker and complaining of night sweats and weight last for many months hence pulmonary consult given for evaluation. Systolic blood pressure staying below 100 hence patient started on IV fluids. Patient has severely decreased vitamin D level of 12.6 hence patient started on vitamin D 50,000 units weekly. 12/4/2021  History Of Present Illness: The patient is a 64 y.o. female who presented to [facility] ER with PMH acid reflux, bronchitis, COPD, emphysema, complaining of worsening weakness and fatigue. Patient states that she has felt fatigued for the last 4 days, has had a fever max of 101.8, and has been having nausea and diarrhea. She took ibuprofen for fever and it did resolve. She has had little to no appetite and came in due to worsening weakness. She denies chills, abdominal pain, and chest pain. She states she is getting dyspneic on exertion. Work up in ER CXR emphysema interstitial disease, no acute infiltrations. CT head no acute intracranial abnormality.  and  troponin negative. Blood cultures pending. Received 1L LR bolus. Patient has not been vaccinated for Covid. Patient is to be admitted to hospital service due to acute hypoxemic respiratory failure due to Covid. REVIEW  OF  SYSTEMS:    Constitutional:  No fevers, chills, nausea, vomiting, + tiredness and fatigue, + night sweats, + weight loss over the last 6 months   Lungs:   No hemoptysis, pleurisy, cough, +SOB   Heart:  No chest pressure with exertion, palpitations,    Abdomen:   No new masses, no bright red blood per rectum   Extremities:  + difficulty ambulation due to weakness, no new lesions   Neurologic: No new motor or sensory changes       PAST MEDICAL HISTORY:  Past Medical History:   Diagnosis Date    Acid reflux     Bronchitis     Pneumonia     pt states she has an old spont on her lung due past pneumonia         PAST SURGICAL HISTORY:  Past Surgical History:   Procedure Laterality Date     SECTION      HYSTERECTOMY          FAMILY HISTORY:  Family History   Problem Relation Age of Onset    Cancer Mother     Heart Disease Father            OBJECTIVE:  BP (!) 89/42   Pulse 82   Temp 98.1 °F (36.7 °C) (Oral)   Resp 18   Ht 5' 4\" (1.626 m)   Wt 82 lb 7 oz (37.4 kg)   SpO2 99%   BMI 14.15 kg/m²   No intake/output data recorded. PHYSICAL  EXAMINATION (employing telemetry and examining through glass window and from doorstep to avoid COVID-19 exposure)   . ..  Captured in coordination with the floor nurse:        SHERMAN:  Awake, alert, oriented x 3, patient appears tired and fatigued   Head/Eyes:  Normocephalic, atraumatic, EOMI and PERRLA bilaterally   Respiratory:   Bilateral decreased air entry in both lung fields, coarse bilateral breath sounds, mild bilateral crackles (As per floor nurse)   Cardiovascular:  Regular rate and rhythm, S1+S2+0 (As per floor nurse)   Abdomen:   Non-distended   Extremities: Moves all, decreased range of motion, no edema   Neurologic: Awake, alert, oriented x 3, cranial nerves II-XII intact   Psychiatric: Normal mood, non-suicidal       CURRENT MEDICATIONS:  Scheduled:   budesonide-formoterol  2 puff Inhalation BID    baricitinib  4 mg Oral Daily    vitamin D  50,000 Units Oral Weekly    sodium chloride flush  5-40 mL IntraVENous 2 times per day    enoxaparin  30 mg SubCUTAneous BID    dexamethasone  6 mg IntraVENous Daily    zinc sulfate  50 mg Oral Daily    melatonin  5 mg Oral Nightly    albuterol sulfate HFA  2 puff Inhalation 4x daily    famotidine  20 mg Oral BID    vitamin B-12  500 mcg Oral Daily    vitamin C  500 mg Oral Daily    ipratropium  2 puff Inhalation 4x daily        PRN:  sodium chloride flush, 5-40 mL, PRN  sodium chloride, 25 mL, PRN  ondansetron, 4 mg, Q8H PRN   Or  ondansetron, 4 mg, Q6H PRN  polyethylene glycol, 17 g, Daily PRN  acetaminophen, 650 mg, Q6H PRN   Or  acetaminophen, 650 mg, Q6H PRN  guaiFENesin-dextromethorphan, 5 mL, Q4H PRN  ibuprofen, 400 mg, Q6H PRN        Infusions:   sodium chloride      sodium chloride 100 mL/hr at 12/04/21 2226       Laboratory Data:  Recent Labs     12/04/21 1902 12/05/21  0341   WBC 8.1 6.6   HGB 14.6 13.5    130     Recent Labs     12/04/21 1902 12/05/21  0341   * 136   K 4.0 4.0   CL 93* 100   CO2 27 24   BUN 26* 24*   CREATININE 0.8 0.5   GLUCOSE 148* 127*     Recent Labs     12/04/21 1902   AST 34*   ALT 18   BILITOT 0.3   ALKPHOS 58     Troponin T:   Recent Labs     12/04/21 1902   TROPONINI <0.01     Pro-BNP: No results for input(s): BNP in the last 72 hours. INR:   Recent Labs     12/04/21 1902   INR 1.02     UA:  Recent Labs     12/05/21  1050   COLORU YELLOW   PHUR 6.0   WBCUA 2   RBCUA 1   BACTERIA NEGATIVE*   CLARITYU Clear   SPECGRAV 1.028   LEUKOCYTESUR Negative   UROBILINOGEN 1.0   BILIRUBINUR Negative   BLOODU Negative   GLUCOSEU 500*     A1C: No results for input(s): LABA1C in the last 72 hours.   ABG:No results for input(s): PHART, HHW3QCU, PO2ART, ZRO4ZZB, BEART, HGBAE, V1TFQSOF, CARBOXHGBART in the last 72 hours.      Imaging:    CT HEAD WO CONTRAST    Result Date: 12/4/2021  . 1. No acute intracranial abnormality. 2. Extensive chronic sinusitis, with superimposed acute right frontal sinusitis. Signed by Dr Augie Miller. [place]    XR CHEST PORTABLE    Result Date: 12/4/2021  Advanced emphysema and interstitial disease. No superimposed acute infiltrates. Signed by Dr Augie Miller. [place]       ASSESSMENT & PLAN:    Principal Problem:    COVID-19  Active Problems:    Chronic sinusitis    Chronic night sweats    Unexplained weight loss    Vitamin D deficiency  Resolved Problems:    Acute hypoxemic respiratory failure due to COVID-19 [facility])    Hyponatremia      Admit patient to inpatient COVID-19 unit under full inpatient status  Droplet plus precautions and contact precautions ordered  Patient started on oxygen via nasal cannula to keep O2 sats between 92% and 94%  Patient started on IV dexamethasone 6 mg daily . .. Day # 2/10  Lovenox 30 units subcu twice daily ordered as per protocol  Patient started on adjuvant meds in the form of vitamin C, vitamin D, zinc, robitussin and melatonin  Patient started on albuterol and Symbicort inhalers  Pharmacy consult given for administration of IV/po antiviral antibiotics as per [facility]'s health care protocol  Patient started on oral Olumiant . .. Day #1  Pulmonary consult given for evaluation and further treatment recommendations regarding COVID-19, chronic COPD, night sweats and unexplained weight loss  She is an ex-smoker quit 18 months ago  Patient vitamin D level is severely depleted at 12.2  Patient started on vitamin D 50,000 units p.o. daily  PT/OT consult given for evaluation and treatment  Case management consult given for DC planning    Chronic medical issues . .. Continue with home meds. Monitor patient closely while admitted. Advised very close f/u with patient's PCP as an outpatient to address chronic medical issues. Repeat labs in a.m.  Electrolyte replacement as per protocol. Patient will be monitored very closely on the floor. Further recommendations as per the hospital course. Discharge Plan: To be determined as per the hospital course      Patient  is on DVT prophylaxis  Current medications reviewed  Lab work reviewed  Radiology/Chest x-ray films reviewed  Treatment recommendations from suspecialities reviewed, appreciated and agreed with  Discussed with the nurse and addressed all questions/concerns  Discussed with Patient and/or Family at the bedside in detail . .. they understand and agree with the management plan. Sujit Chicas MD  12/5/2021 1:41 PM      DISCLAIMER: This note was created with electronic voice recognition which does have occasional errors. If you have any questions regarding the content within the note please do not hesitate to contact me. .. Thanks.

## 2021-12-05 NOTE — PROGRESS NOTES
Pt is alert and oriented, bp low and is asymptomatic with it. Pt also states has night sweats every night. Also worth noting pt stated earlier drastic weight loss over last six months. Will pass along to days for md rounds. Bed alarm remains on for safety.

## 2021-12-05 NOTE — H&P
Bryant Castillo - History & Physical      PCP: . None (Inactive)    Date of Admission: 2021    Date of Service: 2021    Chief Complaint: generalized weakness     History Of Present Illness: The patient is a 64 y.o. female who presented to [facility] ER with PMH acid reflux, bronchitis, COPD, emphysema, complaining of worsening weakness and fatigue. Patient states that she has felt fatigued for the last 4 days, has had a fever max of 101.8, and has been having nausea and diarrhea. She took ibuprofen for fever and it did resolve. She has had little to no appetite and came in due to worsening weakness. She denies chills, abdominal pain, and chest pain. She states she is getting dyspneic on exertion. Work up in ER CXR emphysema interstitial disease, no acute infiltrations. CT head no acute intracranial abnormality.  and  troponin negative. Blood cultures pending. Received 1L LR bolus. Patient has not been vaccinated for Covid. Patient is to be admitted to hospital service due to acute hypoxemic respiratory failure due to Covid. Past Medical History:        Diagnosis Date    Acid reflux     Bronchitis     Pneumonia     pt states she has an old spont on her lung due past pneumonia       Past Surgical History:        Procedure Laterality Date     SECTION      HYSTERECTOMY         Home Medications:  Prior to Admission medications    Medication Sig Start Date End Date Taking?  Authorizing Provider   ibuprofen (ADVIL;MOTRIN) 200 MG tablet Take 400 mg by mouth every 6 hours as needed for Pain   Yes Historical Provider, MD   vitamin C (ASCORBIC ACID) 500 MG tablet Take 500 mg by mouth daily   Yes Historical Provider, MD   vitamin B-12 (CYANOCOBALAMIN) 500 MCG tablet Take 500 mcg by mouth daily   Yes Historical Provider, MD   albuterol sulfate HFA (VENTOLIN HFA) 108 (90 Base) MCG/ACT inhaler Inhale 2 puffs into the lungs 4 times daily as needed for Wheezing 3/8/20 Andi Holguin APRN - CNP   ranitidine (ZANTAC) 150 MG capsule Take 150 mg by mouth 2 times daily    Historical Provider, MD   ipratropium-albuterol (DUONEB) 0.5-2.5 (3) MG/3ML SOLN nebulizer solution Inhale 3 mLs into the lungs every 4 hours 7/16/17   KYM Berumen       Allergies:    Lortab [hydrocodone-acetaminophen]    Social History:    The patient currently lives home with spouse   Tobacco:   reports that she has quit smoking. Her smoking use included cigarettes. She smoked 1.00 pack per day. She has never used smokeless tobacco.  Alcohol:   reports no history of alcohol use. Illicit Drugs: denies    Family History:      Problem Relation Age of Onset    Cancer Mother     Heart Disease Father        Review of Systems:   Review of Systems   Constitutional: Positive for activity change, appetite change, fatigue and fever. HENT: Negative. Respiratory: Positive for shortness of breath. Cardiovascular: Negative. Gastrointestinal: Positive for diarrhea and nausea. Genitourinary: Negative. Musculoskeletal: Negative. Skin: Positive for wound (right finger ). Neurological: Positive for weakness. Psychiatric/Behavioral: Negative. 14 point review of systems is negative except as specifically addressed above. Physical Examination:  BP (!) 97/58   Pulse 75   Temp 98.6 °F (37 °C) (Oral)   Resp 19   Ht 5' 4\" (1.626 m)   Wt 87 lb (39.5 kg)   SpO2 93%   BMI 14.93 kg/m²   Physical Exam  Constitutional:       General: She is not in acute distress. Appearance: Normal appearance. She is not ill-appearing. HENT:      Mouth/Throat:      Mouth: Mucous membranes are dry. Pharynx: Oropharynx is clear. Eyes:      Extraocular Movements: Extraocular movements intact. Conjunctiva/sclera: Conjunctivae normal.      Pupils: Pupils are equal, round, and reactive to light. Cardiovascular:      Rate and Rhythm: Normal rate and regular rhythm. Pulses: Normal pulses. Heart sounds: Normal heart sounds. No murmur heard. Pulmonary:      Effort: Pulmonary effort is normal. No tachypnea or respiratory distress. Breath sounds: Decreased breath sounds present. Chest:      Chest wall: No tenderness. Abdominal:      General: Bowel sounds are normal. There is no distension. Palpations: Abdomen is soft. Tenderness: There is no abdominal tenderness. There is no guarding or rebound. Musculoskeletal:         General: No swelling. Normal range of motion. Cervical back: Normal range of motion and neck supple. No rigidity or tenderness. Right lower leg: No edema. Left lower leg: No edema. Skin:     General: Skin is warm and dry. Capillary Refill: Capillary refill takes less than 2 seconds. Comments: Healing recent Abrasion/laceration 1cm superficial dermal layer   Neurological:      General: No focal deficit present. Mental Status: She is alert and oriented to person, place, and time. Cranial Nerves: No cranial nerve deficit. Motor: Weakness present. Psychiatric:         Mood and Affect: Mood normal.         Behavior: Behavior normal.           Diagnostic Data:  CBC:  Recent Labs     12/04/21 1902   WBC 8.1   HGB 14.6   HCT 45.7        BMP:  Recent Labs     12/04/21 1902   *   K 4.0   CL 93*   CO2 27   BUN 26*   CREATININE 0.8   CALCIUM 9.3     Recent Labs     12/04/21 1902   AST 34*   ALT 18   BILITOT 0.3   ALKPHOS 58     Coag Panel:   Recent Labs     12/04/21 1902   INR 1.02   PROTIME 13.6     Cardiac Enzymes:   Recent Labs     12/04/21 1902   TROPONINI <0.01     Urinalysis:  Lab Results   Component Value Date    NITRU NEGATIVE 10/20/2014    GLUCOSEU NEGATIVE 10/20/2014     CT HEAD WO CONTRAST    Result Date: 12/4/2021  EXAMINATION: CT HEAD WO CONTRAST 12/4/2021 7:38 PM HISTORY: Severe headache. DOSE: 770 mGycm.  Automatic exposure control was utilized in an effort to use as little radiation as possible, without compromising image quality. REPORT: Spiral CT of the head was performed without contrast, reconstructed coronal and sagittal images are also reviewed. COMPARISON: CT head without contrast 5/20/2021. No intracranial hemorrhage, mass or mass effect is identified. The ventricles and basal cisterns appear normal. Gray-white differentiation is appropriate. Review of bone windows shows no fractures. There is extensive mucosal thickening within the sinuses radius that the maxillary sinuses and more so on the left. There is an air-fluid level in the right frontal sinus. This is compatible with acute on chronic sinusitis. . 1. No acute intracranial abnormality. 2. Extensive chronic sinusitis, with superimposed acute right frontal sinusitis. Signed by Dr Dora Mustafa. Frankie    XR CHEST PORTABLE    Result Date: 12/4/2021  EXAMINATION: XR CHEST PORTABLE 12/4/2021 8:13 PM HISTORY: Fever. Report: A portable upright frontal view of the chest was obtained. COMPARISON: Chest x-ray 3/8/2020. The lungs are hyperinflated compatible with advanced emphysema. Interstitial markings are somewhat coarse as before. There is no focal infiltrate or lung consolidation. Heart size is normal. No pneumothorax or pleural effusion is identified. The osseous structures and upper abdomen are acutely unremarkable. Advanced emphysema and interstitial disease. No superimposed acute infiltrates. Signed by Dr Dora Mustafa.  Frankie    Assessment/Plan:    Acute hypoxemic respiratory failure due to COVID-19 (HCC)  - Covid adjuvant therapy with zinc, vitamin C, vitamin D,  Pepcid, melatonin   - Decadron IV    -daily labs BMP, CBC   -CRP, D-dimer   -Procalcitonin    - Robitussin as needed   - Bronchodilators   - Incentive spirometry   - Encourage deep breathing and cough   - Supplemental oxygen as needed   - Droplet plus isolation   Hyponatremia    -IVF   -Avoid offending agents    -Neuro checks   -daily labs     DVT prophylactic: Lovenox Signed:  KYM Calvillo - CNP, 12/4/2021 9:00 PM

## 2021-12-06 LAB
ANION GAP SERPL CALCULATED.3IONS-SCNC: 12 MMOL/L (ref 7–19)
BUN BLDV-MCNC: 14 MG/DL (ref 8–23)
C-REACTIVE PROTEIN: 2.34 MG/DL (ref 0–0.5)
CALCIUM SERPL-MCNC: 8.6 MG/DL (ref 8.8–10.2)
CHLORIDE BLD-SCNC: 103 MMOL/L (ref 98–111)
CO2: 25 MMOL/L (ref 22–29)
CREAT SERPL-MCNC: 0.6 MG/DL (ref 0.5–0.9)
D DIMER: 0.55 UG/ML FEU (ref 0–0.48)
EKG P AXIS: 71 DEGREES
EKG P-R INTERVAL: 146 MS
EKG Q-T INTERVAL: 364 MS
EKG QRS DURATION: 84 MS
EKG QTC CALCULATION (BAZETT): 386 MS
EKG T AXIS: 54 DEGREES
GFR AFRICAN AMERICAN: >59
GFR NON-AFRICAN AMERICAN: >60
GLUCOSE BLD-MCNC: 105 MG/DL (ref 74–109)
HCT VFR BLD CALC: 46.6 % (ref 37–47)
HEMOGLOBIN: 14.7 G/DL (ref 12–16)
MCH RBC QN AUTO: 28.6 PG (ref 27–31)
MCHC RBC AUTO-ENTMCNC: 31.5 G/DL (ref 33–37)
MCV RBC AUTO: 90.7 FL (ref 81–99)
PDW BLD-RTO: 12.6 % (ref 11.5–14.5)
PLATELET # BLD: 177 K/UL (ref 130–400)
PMV BLD AUTO: 10.2 FL (ref 9.4–12.3)
POTASSIUM REFLEX MAGNESIUM: 4.6 MMOL/L (ref 3.5–5)
RBC # BLD: 5.14 M/UL (ref 4.2–5.4)
SODIUM BLD-SCNC: 140 MMOL/L (ref 136–145)
WBC # BLD: 7.2 K/UL (ref 4.8–10.8)

## 2021-12-06 PROCEDURE — 1210000000 HC MED SURG R&B

## 2021-12-06 PROCEDURE — 6370000000 HC RX 637 (ALT 250 FOR IP)

## 2021-12-06 PROCEDURE — 2700000000 HC OXYGEN THERAPY PER DAY

## 2021-12-06 PROCEDURE — 85379 FIBRIN DEGRADATION QUANT: CPT

## 2021-12-06 PROCEDURE — 97116 GAIT TRAINING THERAPY: CPT

## 2021-12-06 PROCEDURE — 99223 1ST HOSP IP/OBS HIGH 75: CPT | Performed by: INTERNAL MEDICINE

## 2021-12-06 PROCEDURE — 97161 PT EVAL LOW COMPLEX 20 MIN: CPT

## 2021-12-06 PROCEDURE — 2580000003 HC RX 258

## 2021-12-06 PROCEDURE — 85027 COMPLETE CBC AUTOMATED: CPT

## 2021-12-06 PROCEDURE — 86140 C-REACTIVE PROTEIN: CPT

## 2021-12-06 PROCEDURE — 6370000000 HC RX 637 (ALT 250 FOR IP): Performed by: HOSPITALIST

## 2021-12-06 PROCEDURE — 6360000002 HC RX W HCPCS

## 2021-12-06 PROCEDURE — 80048 BASIC METABOLIC PNL TOTAL CA: CPT

## 2021-12-06 PROCEDURE — 36415 COLL VENOUS BLD VENIPUNCTURE: CPT

## 2021-12-06 RX ADMIN — BUDESONIDE AND FORMOTEROL FUMARATE DIHYDRATE 2 PUFF: 160; 4.5 AEROSOL RESPIRATORY (INHALATION) at 08:41

## 2021-12-06 RX ADMIN — ALBUTEROL SULFATE 2 PUFF: 90 AEROSOL, METERED RESPIRATORY (INHALATION) at 08:39

## 2021-12-06 RX ADMIN — IPRATROPIUM BROMIDE 2 PUFF: 17 AEROSOL, METERED RESPIRATORY (INHALATION) at 15:29

## 2021-12-06 RX ADMIN — GUAIFENESIN SYRUP AND DEXTROMETHORPHAN 5 ML: 100; 10 SYRUP ORAL at 21:09

## 2021-12-06 RX ADMIN — OXYCODONE HYDROCHLORIDE AND ACETAMINOPHEN 500 MG: 500 TABLET ORAL at 08:40

## 2021-12-06 RX ADMIN — Medication 5 MG: at 21:09

## 2021-12-06 RX ADMIN — ALBUTEROL SULFATE 2 PUFF: 90 AEROSOL, METERED RESPIRATORY (INHALATION) at 15:29

## 2021-12-06 RX ADMIN — SODIUM CHLORIDE, PRESERVATIVE FREE 10 ML: 5 INJECTION INTRAVENOUS at 08:40

## 2021-12-06 RX ADMIN — ENOXAPARIN SODIUM 30 MG: 100 INJECTION SUBCUTANEOUS at 21:08

## 2021-12-06 RX ADMIN — FAMOTIDINE 20 MG: 20 TABLET ORAL at 08:40

## 2021-12-06 RX ADMIN — CYANOCOBALAMIN TAB 500 MCG 500 MCG: 500 TAB at 08:40

## 2021-12-06 RX ADMIN — DEXAMETHASONE SODIUM PHOSPHATE 6 MG: 10 INJECTION INTRAMUSCULAR; INTRAVENOUS at 18:50

## 2021-12-06 RX ADMIN — ALBUTEROL SULFATE 2 PUFF: 90 AEROSOL, METERED RESPIRATORY (INHALATION) at 21:09

## 2021-12-06 RX ADMIN — ALBUTEROL SULFATE 2 PUFF: 90 AEROSOL, METERED RESPIRATORY (INHALATION) at 12:53

## 2021-12-06 RX ADMIN — IPRATROPIUM BROMIDE 2 PUFF: 17 AEROSOL, METERED RESPIRATORY (INHALATION) at 12:53

## 2021-12-06 RX ADMIN — ZINC SULFATE 220 MG (50 MG) CAPSULE 50 MG: CAPSULE at 08:40

## 2021-12-06 RX ADMIN — ENOXAPARIN SODIUM 30 MG: 100 INJECTION SUBCUTANEOUS at 08:40

## 2021-12-06 RX ADMIN — FAMOTIDINE 20 MG: 20 TABLET ORAL at 21:09

## 2021-12-06 RX ADMIN — BUDESONIDE AND FORMOTEROL FUMARATE DIHYDRATE 2 PUFF: 160; 4.5 AEROSOL RESPIRATORY (INHALATION) at 21:09

## 2021-12-06 RX ADMIN — SODIUM CHLORIDE, PRESERVATIVE FREE 10 ML: 5 INJECTION INTRAVENOUS at 21:08

## 2021-12-06 RX ADMIN — ACETAMINOPHEN 650 MG: 325 TABLET ORAL at 18:23

## 2021-12-06 RX ADMIN — BARICITINIB 4 MG: 2 TABLET, FILM COATED ORAL at 08:40

## 2021-12-06 RX ADMIN — IPRATROPIUM BROMIDE 2 PUFF: 17 AEROSOL, METERED RESPIRATORY (INHALATION) at 21:09

## 2021-12-06 RX ADMIN — IPRATROPIUM BROMIDE 2 PUFF: 17 AEROSOL, METERED RESPIRATORY (INHALATION) at 08:39

## 2021-12-06 ASSESSMENT — PAIN SCALES - GENERAL: PAINLEVEL_OUTOF10: 3

## 2021-12-06 NOTE — PROGRESS NOTES
Physical Therapy    Facility/Department: [facility] ONCOLOGY UNIT  Initial Assessment    NAME: Caroline Callaway  : 1960  MRN: [id number]    Date of Service: 2021    Discharge Recommendations:  Home with assist PRN        Assessment   Body structures, Functions, Activity limitations: Decreased functional mobility ; Decreased endurance; Decreased balance  Assessment: Pt AMB IN ROOM WITHOUT DIFFICULTY, DECONDITIONED OVERALL. WILL BE SAFE TO RETURN HOME WITH FAMILY. PT Education: PT Role; Plan of Care  REQUIRES PT FOLLOW UP: Yes  Activity Tolerance  Activity Tolerance: Patient limited by fatigue; Patient Tolerated treatment well       Patient Diagnosis(es): The primary encounter diagnosis was Acute hypoxemic respiratory failure due to COVID-19 [facility]). Diagnoses of Hypotension due to hypovolemia and Chronic obstructive pulmonary disease, unspecified COPD type ([facility] [address] were also pertinent to this visit. has a past medical history of Acid reflux, Bronchitis, and Pneumonia. has a past surgical history that includes Hysterectomy and  section.     Restrictions  Restrictions/Precautions  Restrictions/Precautions: Fall Risk  Vision/Hearing  Vision: Within Functional Limits  Hearing: Within functional limits     Subjective  General  Diagnosis: COVID, RESP FAILURE  Subjective  Subjective: Pt WILLING TO PARTICIPATE  Pain Screening  Patient Currently in Pain: Denies  Vital Signs  BP: 105/62  BP Location: Left upper arm  Patient Currently in Pain: Denies  Oxygen Therapy  O2 Device: Nasal cannula  O2 Flow Rate (L/min): 3 L/min       Orientation  Orientation  Overall Orientation Status: Within Normal Limits  Social/Functional History  Social/Functional History  Lives With: Spouse  ADL Assistance: Independent  Homemaking Assistance: Independent  Homemaking Responsibilities: Yes  Ambulation Assistance: Independent  Transfer Assistance: Independent  Active : Yes  Occupation: Full time employment  Cognition Cognition  Overall Cognitive Status: WNL    Objective          AROM RLE (degrees)  RLE AROM: WFL  AROM LLE (degrees)  LLE AROM : WFL  Strength RLE  Strength RLE: WFL  Strength LLE  Strength LLE: WFL        Bed mobility  Supine to Sit: Independent  Sit to Supine: Independent  Transfers  Sit to Stand: Contact guard assistance  Stand to sit: Contact guard assistance  Ambulation 1  Device: No Device  Other Apparatus: O2  Assistance: Contact guard assistance  Quality of Gait: NO LOB  Gait Deviations: Slow Yaritza; Decreased step length  Distance: 10' X 2     Balance  Sitting - Dynamic: Good  Standing - Dynamic: Good; -        Plan   Plan  Times per week: AT LEAST 4-6  Current Treatment Recommendations: Balance Training, Functional Mobility Training, Transfer Training, Gait Training, Endurance Training, Safety Education & Training, Patient/Caregiver Education & Training  Safety Devices  Type of devices: Call light within reach, Bed alarm in place    G-Code       OutComes Score                                                  AM-PAC Score             Goals  Short term goals  Time Frame for Short term goals: 14 DAYS  Short term goal 1: TRANSFERS INDEPENDENT  Short term goal 2: ' SUP-IND       Therapy Time   Individual Concurrent Group Co-treatment   Time In           Time Out           Minutes                   Nichole Calhoun PT

## 2021-12-06 NOTE — PROGRESS NOTES
Comprehensive Nutrition Assessment    Type and Reason for Visit:  Initial, Positive Nutrition Screen    Nutrition Recommendations/Plan:   Ensure Enlive/Plus with meals. Nutrition Assessment:  +NS for wt loss and poor po intake PTA. Pt nutritionally compromised AEB reported poor po intake PTA and wt loss of 9%, 8 lbs, X ~6 months (non-significant) as well as severely low BMI (14). Noted Vitamin D deficiency and reported MEERA. Will start ONS to promote po intake. Malnutrition Assessment:  Malnutrition Status: At risk for malnutrition (Comment)    Context:  Acute Illness     Findings of the 6 clinical characteristics of malnutrition:  Energy Intake:  Mild decrease in energy intake (Comment)  Weight Loss:  No significant weight loss     Body Fat Loss:  Unable to assess     Muscle Mass Loss:  Unable to assess    Fluid Accumulation:  No significant fluid accumulation     Strength:  Not Performed    Estimated Daily Nutrient Needs:  Energy (kcal):  3623-3871 kcals/day; Weight Used for Energy Requirements:  Current (30-35 kcals/kg)     Protein (g):  44-52 g/pro/day; Weight Used for Protein Requirements:  Current (1.2-1.4 g/kg)        Fluid (ml/day):  4949-3264 mL/fluid/day; Method Used for Fluid Requirements:  1 ml/kcal      Current Nutrition Therapies:    ADULT DIET;  Regular; Safety Tray; Safety Tray (Disposables)    Anthropometric Measures:  · Height: 5' 4\" (162.6 cm)  · Current Body Weight: 82 lb (37.2 kg)   · Admission Body Weight: 82 lb (37.2 kg)    · Usual Body Weight: 90 lb (40.8 kg) (5/5/2021)     · Ideal Body Weight: 120 lbs  · BMI: 14.1   · BMI Categories: Underweight (BMI less than 18.5)       Nutrition Diagnosis:   · Inadequate oral intake related to impaired respiratory function as evidenced by poor intake prior to admission, BMI, weight loss (9% X 6 months)    Nutrition Interventions:   Food and/or Nutrient Delivery:  Continue Current Diet, Start Oral Nutrition Supplement  Nutrition Education/Counseling:  Education not indicated   Coordination of Nutrition Care:  Continue to monitor while inpatient    Goals:  Po intake 50% or greater of meals and ONS. Wt stable or appropriate gain. Nutrition Monitoring and Evaluation:   Food/Nutrient Intake Outcomes:  Food and Nutrient Intake, Supplement Intake  Physical Signs/Symptoms Outcomes:  Biochemical Data, Nutrition Focused Physical Findings, Weight     Discharge Planning:     Too soon to determine     Electronically signed by Vidhi Dickerson MS, RD, LD on 12/6/21 at 10:47 AM CST    Contact: [phone]

## 2021-12-06 NOTE — ACP (ADVANCE CARE PLANNING)
Advance Care Planning     Advance Care Planning Activator (Inpatient)  Conversation Note      Date of ACP Conversation: 12/6/2021     Conversation Conducted with: Patient    ACP Activator: Andria Easton      [facility] Decision Maker:     Primary Decision Maker: Angelica Juarez - Spouse - [phone]    Secondary Decision Maker: Cristina Richardson.Chon - Child - [phone]    Supplemental (Other) Decision Maker: Kal Thomas - Other - [phone]      Care Preferences    Ventilation: \"If you were in your present state of health and suddenly became very ill and were unable to breathe on your own, what would your preference be about the use of a ventilator (breathing machine) if it were available to you? \"      Would the patient desire the use of ventilator (breathing machine)?: Yes    \"If your health worsens and it becomes clear that your chance of recovery is unlikely, what would your preference be about the use of a ventilator (breathing machine) if it were available to you? \"     Would the patient desire the use of ventilator (breathing machine)?: Yes      Resuscitation  \"CPR works best to restart the heart when there is a sudden event, like a heart attack, in someone who is otherwise healthy. Unfortunately, CPR does not typically restart the heart for people who have serious health conditions or who are very sick. \"    \"In the event your heart stopped as a result of an underlying serious health condition, would you want attempts to be made to restart your heart (answer \"yes\" for attempt to resuscitate) or would you prefer a natural death (answer \"no\" for do not attempt to resuscitate)? \" Yes       [] Yes   [x] No   Educated Patient / Clearence Golds regarding differences between Advance Directives and portable DNR orders.       Conversation Outcomes:  [x] ACP discussion completed    Electronically signed by Andria Easton on 12/6/2021 at 10:36 AM

## 2021-12-06 NOTE — PLAN OF CARE
Nutrition Problem #1: Inadequate oral intake  Intervention: Food and/or Nutrient Delivery: Continue Current Diet, Start Oral Nutrition Supplement  Nutritional Goals: Po intake 50% or greater of meals and ONS. Wt stable or appropriate gain.

## 2021-12-06 NOTE — PROGRESS NOTES
[place], [place], [address]    DEPARTMENT OF HOSPITALIST MEDICINE      PROGRESS  NOTE:    NOTE: Portions of this note have been copied forward, however, changed to reflect the most current clinical status of this patient. Patient:  Dayday Gonzales  YOB: 1960  Date of Service: 12/6/2021  MRN: [id number]   Acct: [de-identified]   Primary Care Physician: . None (Inactive)  Advance Directive: Full Code  Admit Date: 12/4/2021       Hospital Day: 2      CHIEF COMPLAINT:  Chief Complaint   Patient presents with    Headache     X 4 DAYS.  Fever    Diarrhea       SUBJECTIVE:  Patient seen at bedside. Shortness of breath is slowly improving. Also feeling little better and feels he is ready to go home by tomorrow. [address]  COURSE:    12/6/2021  Patient is requiring 2 to 3 L oxygen via nasal cannula to maintain O2 sats in 90s. Slowly improving clinically. Encourage patient to ambulate. Pulmonary evaluated the patient and feel she should be ready to be discharged by tomorrow. 12/5/2021  Patient responding well to oxygen via nasal cannula and maintaining O2 sats in 90s. He is an ex-smoker and complaining of night sweats and weight last for many months hence pulmonary consult given for evaluation. Systolic blood pressure staying below 100 hence patient started on IV fluids. Patient has severely decreased vitamin D level of 12.6 hence patient started on vitamin D 50,000 units weekly. 12/4/2021  History Of Present Illness: The patient is a 64 y.o. female who presented to [facility] ER with PMH acid reflux, bronchitis, COPD, emphysema, complaining of worsening weakness and fatigue. Patient states that she has felt fatigued for the last 4 days, has had a fever max of 101.8, and has been having nausea and diarrhea. She took ibuprofen for fever and it did resolve. She has had little to no appetite and came in due to worsening weakness.   She denies chills, abdominal pain, and chest pain. She states she is getting dyspneic on exertion. Work up in ER CXR emphysema interstitial disease, no acute infiltrations. CT head no acute intracranial abnormality.  and  troponin negative. Blood cultures pending. Received 1L LR bolus. Patient has not been vaccinated for Covid. Patient is to be admitted to hospital service due to acute hypoxemic respiratory failure due to Covid. REVIEW  OF  SYSTEMS:    Constitutional:  No fevers, chills, nausea, vomiting, + tiredness and fatigue, + night sweats, + weight loss over the last 6 months   Lungs:   No hemoptysis, pleurisy, cough, +SOB   Heart:  No chest pressure with exertion, palpitations,    Abdomen:   No new masses, no bright red blood per rectum   Extremities:  + difficulty ambulation due to weakness, no new lesions   Neurologic: No new motor or sensory changes       PAST MEDICAL HISTORY:  Past Medical History:   Diagnosis Date    Acid reflux     Bronchitis     Pneumonia     pt states she has an old spont on her lung due past pneumonia         PAST SURGICAL HISTORY:  Past Surgical History:   Procedure Laterality Date     SECTION      HYSTERECTOMY          FAMILY HISTORY:  Family History   Problem Relation Age of Onset    Cancer Mother     Heart Disease Father            OBJECTIVE:  BP 95/60   Pulse 73   Temp 97.6 °F (36.4 °C) (Temporal)   Resp 16   Ht 5' 4\" (1.626 m)   Wt 82 lb 7 oz (37.4 kg)   SpO2 96%   BMI 14.15 kg/m²   No intake/output data recorded. PHYSICAL  EXAMINATION (employing telemetry and examining through glass window and from doorstep to avoid COVID-19 exposure)   . ..  Captured in coordination with the floor nurse:        SHERMAN:  Awake, alert, oriented x 3, patient appears tired and fatigued   Head/Eyes:  Normocephalic, atraumatic, EOMI and PERRLA bilaterally   Respiratory:   Bilateral decreased air entry in both lung fields, coarse bilateral breath sounds, mild bilateral crackles (As per floor nurse)   Cardiovascular:  Regular rate and rhythm, S1+S2+0 (As per floor nurse)   Abdomen:   Non-distended   Extremities: Moves all, decreased range of motion, no edema   Neurologic: Awake, alert, oriented x 3, cranial nerves II-XII intact   Psychiatric: Normal mood, non-suicidal       CURRENT MEDICATIONS:  Scheduled:   budesonide-formoterol  2 puff Inhalation BID    baricitinib  4 mg Oral Daily    vitamin D  50,000 Units Oral Weekly    sodium chloride flush  5-40 mL IntraVENous 2 times per day    enoxaparin  30 mg SubCUTAneous BID    dexamethasone  6 mg IntraVENous Daily    zinc sulfate  50 mg Oral Daily    melatonin  5 mg Oral Nightly    albuterol sulfate HFA  2 puff Inhalation 4x daily    famotidine  20 mg Oral BID    vitamin B-12  500 mcg Oral Daily    vitamin C  500 mg Oral Daily    ipratropium  2 puff Inhalation 4x daily        PRN:  sodium chloride flush, 5-40 mL, PRN  sodium chloride, 25 mL, PRN  ondansetron, 4 mg, Q8H PRN   Or  ondansetron, 4 mg, Q6H PRN  polyethylene glycol, 17 g, Daily PRN  acetaminophen, 650 mg, Q6H PRN   Or  acetaminophen, 650 mg, Q6H PRN  guaiFENesin-dextromethorphan, 5 mL, Q4H PRN  ibuprofen, 400 mg, Q6H PRN        Infusions:   sodium chloride      sodium chloride 100 mL/hr at 12/04/21 2226       Laboratory Data:  Recent Labs     12/04/21  1902 12/05/21  0341 12/06/21  0608   WBC 8.1 6.6 7.2   HGB 14.6 13.5 14.7    130 177     Recent Labs     12/04/21  1902 12/05/21  0341 12/06/21  0608   * 136 140   K 4.0 4.0 4.6   CL 93* 100 103   CO2 27 24 25   BUN 26* 24* 14   CREATININE 0.8 0.5 0.6   GLUCOSE 148* 127* 105     Recent Labs     12/04/21 1902   AST 34*   ALT 18   BILITOT 0.3   ALKPHOS 58     Troponin T:   Recent Labs     12/04/21 1902   TROPONINI <0.01     Pro-BNP: No results for input(s): BNP in the last 72 hours.   INR:   Recent Labs     12/04/21 1902   INR 1.02     UA:  Recent Labs     12/05/21  1050   COLORU YELLOW   PHUR 6.0   WBCUA 2   RBCUA 1 BACTERIA NEGATIVE*   CLARITYU Clear   SPECGRAV 1.028   LEUKOCYTESUR Negative   UROBILINOGEN 1.0   BILIRUBINUR Negative   BLOODU Negative   GLUCOSEU 500*     A1C: No results for input(s): LABA1C in the last 72 hours. ABG:No results for input(s): PHART, GOC1VNN, PO2ART, QDP4VDS, BEART, HGBAE, E3JWPHLD, CARBOXHGBART in the last 72 hours. Imaging:    CT HEAD WO CONTRAST    Result Date: 12/4/2021  . 1. No acute intracranial abnormality. 2. Extensive chronic sinusitis, with superimposed acute right frontal sinusitis. Signed by Dr Thuan Simon. [place]    XR CHEST PORTABLE    Result Date: 12/4/2021  Advanced emphysema and interstitial disease. No superimposed acute infiltrates. Signed by Dr Thuan Simon. [place]       ASSESSMENT & PLAN:    Principal Problem:    COVID-19  Active Problems:    Chronic sinusitis    Chronic night sweats    Unexplained weight loss    Vitamin D deficiency  Resolved Problems:    Acute hypoxemic respiratory failure due to COVID-19 [facility])    Hyponatremia      Admit patient to inpatient COVID-19 unit under full inpatient status  Droplet plus precautions and contact precautions ordered  Patient started on oxygen via nasal cannula to keep O2 sats between 92% and 94%  Patient started on IV dexamethasone 6 mg daily . .. Day # 3/10  Lovenox 30 units subcu twice daily ordered as per protocol  Patient started on adjuvant meds in the form of vitamin C, vitamin D, zinc, robitussin and melatonin  Patient started on albuterol and Symbicort inhalers  Pharmacy consult given for administration of IV/po antiviral antibiotics as per [facility]'s health care protocol  Patient started on oral Olumiant . ..  Day #2  Pulmonary consult given for evaluation and further treatment recommendations regarding COVID-19, chronic COPD, night sweats and unexplained weight loss  Pulmonary evaluated the patient and cleared her to be discharged by tomorrow morning with outpatient follow-up  She is an ex-smoker quit 18 months ago  Patient vitamin D level is severely depleted at 12.2  Patient started on vitamin D 50,000 units p.o. daily  Sodium level continues to improve and it is 140 today  CRP and D-dimer are slowly improving and showing downwards trending  PT/OT consult given for evaluation and treatment  Case management consult given for DC planning    Chronic medical issues . .. Continue with home meds. Monitor patient closely while admitted. Advised very close f/u with patient's PCP as an outpatient to address chronic medical issues. Repeat labs in a.m. Electrolyte replacement as per protocol. Patient will be monitored very closely on the floor. Further recommendations as per the hospital course. Discharge Plan: To be determined as per the hospital course      Patient  is on DVT prophylaxis  Current medications reviewed  Lab work reviewed  Radiology/Chest x-ray films reviewed  Treatment recommendations from suspecialities reviewed, appreciated and agreed with  Discussed with the nurse and addressed all questions/concerns  Discussed with Patient and/or Family at the bedside in detail . .. they understand and agree with the management plan. Mitra Gallo MD  12/6/2021 3:09 PM      DISCLAIMER: This note was created with electronic voice recognition which does have occasional errors. If you have any questions regarding the content within the note please do not hesitate to contact me. .. Thanks.

## 2021-12-06 NOTE — CONSULTS
Pulmonary and Critical Care Consult Note    Leandro Munson    MRN# [id number]    Acct# [de-identified]  2021   12:23 PM CST    Referring Lynda Alvarez MD      Chief Complaint: Shortness of breath    Requesting physician: Dr. Jose Campoverde    Reason for consult: COVID-19, COPD      HPI: We have been consulted to see this 64y.o. year old female born on 1960. The patient presented to the hospital due to increasing shortness of breath. She reports having had a fever of 101.8. She was feeling ill a few days prior to her presentation. She was found hypoxic in the emergency room. She was also hyponatremic. Her chest x-ray was unremarkable. She tested positive for COVID-19. The patient was admitted to the hospital and received treatment for Covid. She continues to be on nasal cannula oxygen at 2 L/min. She is maintaining adequate saturation. She is feeling significantly improved since hospitalization. She quit smoking about 2 years ago. She underwent hysterectomy in the past and she says since her hysterectomy she has been having night sweats. She currently weighs about 82 pounds however she says the most she did weigh in her life was about 100 pounds. She says most often her weight runs about 90 pounds. I was asked to see her regarding the above.       Past Medical History      Past Medical History:   Diagnosis Date    Acid reflux     Bronchitis     Pneumonia     pt states she has an old spont on her lung due past pneumonia     SurgicalHistory  Past Surgical History:   Procedure Laterality Date     SECTION      HYSTERECTOMY       Allergies  Allergies   Allergen Reactions    Lortab [Hydrocodone-Acetaminophen] Other (See Comments)     Medications    budesonide-formoterol, 2 puff, Inhalation, BID    baricitinib, 4 mg, Oral, Daily    vitamin D, 50,000 Units, Oral, Weekly    sodium chloride flush, 5-40 mL, IntraVENous, 2 times per day    enoxaparin, 30 mg, SubCUTAneous, BID    dexamethasone, 6 mg, IntraVENous, Daily    zinc sulfate, 50 mg, Oral, Daily    melatonin, 5 mg, Oral, Nightly    albuterol sulfate HFA, 2 puff, Inhalation, 4x daily    famotidine, 20 mg, Oral, BID    vitamin B-12, 500 mcg, Oral, Daily    vitamin C, 500 mg, Oral, Daily    ipratropium, 2 puff, Inhalation, 4x daily   Social History   reports that she has quit smoking. Her smoking use included cigarettes. She smoked 1.00 pack per day. She has never used smokeless tobacco. She reports that she does not drink alcohol and does not use drugs. Family History  family history includes Cancer in her mother; Heart Disease in her father.     Review of Systems:  12 point review of systems is negative except as below:  CONSTITUTIONAL:  positive for  malaise  RESPIRATORY:  positive for  dyspnea and wheezing  CARDIOVASCULAR:  positive for  dyspnea    Physical Exam:  BP 95/60   Pulse 73   Temp 97.6 °F (36.4 °C) (Temporal)   Resp 16   Ht 5' 4\" (1.626 m)   Wt 82 lb 7 oz (37.4 kg)   SpO2 96%   BMI 14.15 kg/m²     Intake/Output Summary (Last 24 hours) at 12/6/2021 1223  Last data filed at 12/6/2021 1105  Gross per 24 hour   Intake 2727 ml   Output --   Net 2727 ml       General appearance: Elderly white female who does not appear to be in any distress   HEENT: Normocephalic atraumatic pupils round reactive to light extraocular muscles are intact   Heart:S1-S2 distant sounds no murmurs  Lungs: Diminished bilaterally no rubs or chest wall tenderness or dullness to percussion  Abdomen: Soft nontender no organomegalies normal bowel sounds  Extremities: No clubbing cyanosis or edema  Neuro: No focal findings  Skin: Intact        Labs:  Recent Labs     12/04/21  1902 12/05/21  0341 12/06/21  0608   WBC 8.1 6.6 7.2   RBC 5.12 4.77 5.14   HGB 14.6 13.5 14.7   HCT 45.7 43.0 46.6    130 177   MCV 89.3 90.1 90.7   MCH 28.5 28.3 28.6   MCHC 31.9* 31.4* 31.5*   RDW 12.4 12.5 12.6      Recent Labs     12/04/21 1902 12/05/21 0341 12/06/21  0608   * 136 140   K 4.0 4.0 4.6   CL 93* 100 103   CO2 27 24 25   BUN 26* 24* 14   CREATININE 0.8 0.5 0.6   CALCIUM 9.3 8.7* 8.6*   GLUCOSE 148* 127* 105      No results for input(s): PHART, XYI3DYN, PO2ART, FMP2DCG, A0GFSEXG, BEART in the last 72 hours. Recent Labs     12/04/21 1902 12/05/21 0341 12/05/21  0905 12/06/21  0608   AST 34*  --   --   --    ALT 18  --   --   --    ALKPHOS 58  --   --   --    BILITOT 0.3  --   --   --    MG  --   --  2.1  --    CALCIUM 9.3   < >  --  8.6*   PHOS  --   --  3.5  --    TROPONINI <0.01  --   --   --    LACTA 1.3  --   --   --    INR 1.02  --   --   --    DDIMER  --    < >  --  0.55*   PROCAL 0.10*  --   --   --     < > = values in this interval not displayed. Recent Labs     12/04/21  1900   BC No Growth to date. Any change in status will be called. Radiograph: CT HEAD WO CONTRAST    Result Date: 12/4/2021  . 1. No acute intracranial abnormality. 2. Extensive chronic sinusitis, with superimposed acute right frontal sinusitis. Signed by Dr Tyson Patel. [place]    XR CHEST PORTABLE    Result Date: 12/4/2021  Advanced emphysema and interstitial disease. No superimposed acute infiltrates. Signed by Dr Tyson Patel. Jay Yin       My radiograph interpretation/independent review of imaging: Reviewed    Problem [facility] by [facility]:  Hospital Problems           Last Modified POA    * (Principal) COVID-19 12/5/2021 Yes    Chronic sinusitis 12/5/2021 Yes    Chronic night sweats 12/5/2021 Yes    Unexplained weight loss 12/5/2021 Yes    Vitamin D deficiency 12/5/2021 Yes             Pulmonary Assessment/plan:    1. Acute hypoxic respiratory failure due to COVID-19 infection. Continue oxygen supplementation as necessary to assure adequate oxygenation. 2. COVID-19 infection treated appropriately.   She is clinically improving  3. Possible underlying COPD. She will need work-up as outpatient pulmonary function testing. 4. Wheezing likely due to the above continue bronchodilators. 5. DVT prophylaxis. 6. Discharge planning per hospitalist service. She might be ready for discharge in the morning she continues to be stable. She has minimal oxygen requirement at this time. Anton Myers MD, [facility], [facility]    The above note was generated using voice recognition software. Inadvertent typographical errors in transcription may have occurred.     Electronically signed by Anton Myers MD on 12/06/21 at 12:23 PM

## 2021-12-06 NOTE — CARE COORDINATION
Date / Time of Evaluation:   12/6/2021    2:15 PM  Assessment Completed by:   Scott Roman RN      Patient Name:   Tisha Zuñiga  MRN:   [id number]  YOB: 1960    Patient Admission Status:   Inpatient [101]    Patient Contact Information:    [address] [place] [address] [id number] (home) [phone] (work)  Telephone Information:   Mobile [phone]     Above information verified? [x]   Yes  []   No    (Best Practice:   Have patient/caregiver verify above address and phone number by stating out loud their current address and reachable phone number. Initial Assessment Completed at bedside with:      [x]   Patient  []   Family/Caregiver/Guardian   []   Other:      Current PCP:    Needs PCP at discharge    PCP verified? [x]   Yes  []   No    Emergency Contacts:    Extended Emergency Contact Information  Primary Emergency Contact: [facility]  Address: [address] [facility], [address] [place] [address]. [address] Phone: [phone]  Mobile Phone: [phone]  Relation: Spouse  Secondary Emergency Contact: Kenyatta Welch Phone: [phone]  Relation: Child    Advance Directives:    Does Ms. Clotilde Torrez have an advance directive in her electronic medical record? [x]   Yes  []   No    Code Status:   Full Code      Have you been vaccinated for COVID-19 (SARS-CoV-2)? []   Yes  [x]   No                   If so, when?     Which :         []   Pfizer[employer]  []   Moderna  []   [employer]  []   Other:       Do you have any of the following unmet social needs that would keep you from returning home safely:    []   Yes  [x]   No                    Unmet Social Needs:           []   Living Situation/Housing  []   Food  []   Stroke Education   []   Utilities  []   Personal Safety  []   Financial Strain  []   Employment  []   Mental Health  []   Substance Abuse  []   Transportation Barriers    Additional Unmet Social Needs Notes:   Spouse presently in the hospital. Pt states, ex dtr in law staying in the home      Financial:    Payor: BCBS / Plan: [address] [place] [address] / Product Type: *No Product type* /     Pre-Cert required for SNF:     [x]   Yes  []   No    Have Long Term Care Insurance:      []   Yes  [x]   No      Pharmacy:    [facility] [address], [address] [phone] [facility] [phone]  [address]  [address] [place] [address]  Phone: [phone] Fax: [phone]    Potential assistance purchasing medications? [x]   Yes  []   No      ADLS:   Pt reports, she is independent w/ADL's and IADL's, Her spouse attends HD 3 x wk and has health problems    Support System:   Family Members      Current Home Environment:       Steps:       [x]   Yes  []   No    If yes, how many?  Managing    Plans to RETURN to current housing:     [x]   Yes  []   No    Barriers to RETURNING to current housing:  None stated    Currently ACTIVE with Home Health CARE:      []   Yes  [x]   No      DME Provider:   none      Had HOME OXYGEN prior to admission:      []   Yes  [x]   No    Has a pulse oximetry unit at home:     []   Yes  [x]   No      Active with HD/PD prior to admission:             []   Yes  [x]   No    Transition Plan:  Home, will consider HC at discharge    Transportation PLAN for Discharge:  family    Factors facilitating achievement of predicted outcomes: medically stable    Barriers to discharge:  COVID, does not have a PCP      Patient Deficits:    []   Yes   [x]   No    If yes:    []   Confusion/Memory  []   Visual  []   Motor/Sensory         []   Right arm         []   Right leg         []   Left arm         []   Left leg  []   Language/Speech         []   Aphasia         []   Dysarthria         []   Swallow      [place] Coma Scale  Eye Opening: Spontaneous  Best Verbal Response: Oriented  Best Motor Response: Obeys commands  Ebony Coma Scale Score: 15    Patient Deficit Notes:   Appetite has been poor, present wt 82#        Additional CM Notes:  Pt reports, she was sick in November, she has \"always been thin\" but since last illness not eating very well and spouse has medically issues that cause her to be stressed. Pt does not wear 02 at home, but is requiring in the hospital. Pt states, her ex daughter in law is staying in the home. CM will f/u with patient in am regarding [facility]. and other resources.          Eleanor Chanel and/or her family were provided with choice of provider:    [x]   Yes   []   No        Taylor Mukherjee RN  [address] Management  Phone:  [phone]         Fax:  [phone]  Electronically signed by Taylor Mukherjee RN on 12/6/2021 at 2:25 PM

## 2021-12-07 VITALS
WEIGHT: 82.44 LBS | DIASTOLIC BLOOD PRESSURE: 60 MMHG | SYSTOLIC BLOOD PRESSURE: 83 MMHG | BODY MASS INDEX: 14.07 KG/M2 | HEART RATE: 89 BPM | OXYGEN SATURATION: 97 % | TEMPERATURE: 98.9 F | HEIGHT: 64 IN | RESPIRATION RATE: 16 BRPM

## 2021-12-07 LAB
ALBUMIN SERPL-MCNC: 3.1 G/DL (ref 3.5–5.2)
ALP BLD-CCNC: 46 U/L (ref 35–104)
ALT SERPL-CCNC: 15 U/L (ref 5–33)
ANION GAP SERPL CALCULATED.3IONS-SCNC: 14 MMOL/L (ref 7–19)
AST SERPL-CCNC: 31 U/L (ref 5–32)
BASOPHILS ABSOLUTE: 0 K/UL (ref 0–0.2)
BASOPHILS RELATIVE PERCENT: 0.1 % (ref 0–1)
BILIRUB SERPL-MCNC: 0.3 MG/DL (ref 0.2–1.2)
BUN BLDV-MCNC: 14 MG/DL (ref 8–23)
C-REACTIVE PROTEIN: 3.91 MG/DL (ref 0–0.5)
CALCIUM SERPL-MCNC: 8.5 MG/DL (ref 8.8–10.2)
CHLORIDE BLD-SCNC: 100 MMOL/L (ref 98–111)
CO2: 25 MMOL/L (ref 22–29)
CREAT SERPL-MCNC: 0.6 MG/DL (ref 0.5–0.9)
D DIMER: 0.45 UG/ML FEU (ref 0–0.48)
EOSINOPHILS ABSOLUTE: 0 K/UL (ref 0–0.6)
EOSINOPHILS RELATIVE PERCENT: 0 % (ref 0–5)
GFR AFRICAN AMERICAN: >59
GFR NON-AFRICAN AMERICAN: >60
GLUCOSE BLD-MCNC: 177 MG/DL (ref 74–109)
HCT VFR BLD CALC: 41.6 % (ref 37–47)
HEMOGLOBIN: 13.5 G/DL (ref 12–16)
IMMATURE GRANULOCYTES #: 0 K/UL
LYMPHOCYTES ABSOLUTE: 0.8 K/UL (ref 1.1–4.5)
LYMPHOCYTES RELATIVE PERCENT: 10.1 % (ref 20–40)
MCH RBC QN AUTO: 29.2 PG (ref 27–31)
MCHC RBC AUTO-ENTMCNC: 32.5 G/DL (ref 33–37)
MCV RBC AUTO: 90 FL (ref 81–99)
MONOCYTES ABSOLUTE: 0.2 K/UL (ref 0–0.9)
MONOCYTES RELATIVE PERCENT: 2.9 % (ref 0–10)
NEUTROPHILS ABSOLUTE: 6.8 K/UL (ref 1.5–7.5)
NEUTROPHILS RELATIVE PERCENT: 86.5 % (ref 50–65)
PDW BLD-RTO: 12.5 % (ref 11.5–14.5)
PLATELET # BLD: 147 K/UL (ref 130–400)
PMV BLD AUTO: 10.3 FL (ref 9.4–12.3)
POTASSIUM REFLEX MAGNESIUM: 4.2 MMOL/L (ref 3.5–5)
PROCALCITONIN: 0.15 NG/ML (ref 0–0.09)
RBC # BLD: 4.62 M/UL (ref 4.2–5.4)
SODIUM BLD-SCNC: 139 MMOL/L (ref 136–145)
TOTAL PROTEIN: 4.9 G/DL (ref 6.6–8.7)
WBC # BLD: 7.8 K/UL (ref 4.8–10.8)

## 2021-12-07 PROCEDURE — 6370000000 HC RX 637 (ALT 250 FOR IP): Performed by: HOSPITALIST

## 2021-12-07 PROCEDURE — 97530 THERAPEUTIC ACTIVITIES: CPT

## 2021-12-07 PROCEDURE — 84145 PROCALCITONIN (PCT): CPT

## 2021-12-07 PROCEDURE — 2580000003 HC RX 258

## 2021-12-07 PROCEDURE — 97150 GROUP THERAPEUTIC PROCEDURES: CPT

## 2021-12-07 PROCEDURE — 2700000000 HC OXYGEN THERAPY PER DAY

## 2021-12-07 PROCEDURE — 6370000000 HC RX 637 (ALT 250 FOR IP)

## 2021-12-07 PROCEDURE — 97165 OT EVAL LOW COMPLEX 30 MIN: CPT

## 2021-12-07 PROCEDURE — 6360000002 HC RX W HCPCS

## 2021-12-07 PROCEDURE — 85025 COMPLETE CBC W/AUTO DIFF WBC: CPT

## 2021-12-07 PROCEDURE — 86140 C-REACTIVE PROTEIN: CPT

## 2021-12-07 PROCEDURE — 85379 FIBRIN DEGRADATION QUANT: CPT

## 2021-12-07 PROCEDURE — 36415 COLL VENOUS BLD VENIPUNCTURE: CPT

## 2021-12-07 PROCEDURE — 80053 COMPREHEN METABOLIC PANEL: CPT

## 2021-12-07 PROCEDURE — 97110 THERAPEUTIC EXERCISES: CPT

## 2021-12-07 RX ORDER — BUDESONIDE AND FORMOTEROL FUMARATE DIHYDRATE 160; 4.5 UG/1; UG/1
2 AEROSOL RESPIRATORY (INHALATION) 2 TIMES DAILY
Qty: 10.2 G | Refills: 0 | Status: SHIPPED | OUTPATIENT
Start: 2021-12-07 | End: 2021-12-16 | Stop reason: ALTCHOICE

## 2021-12-07 RX ORDER — ERGOCALCIFEROL 1.25 MG/1
50000 CAPSULE ORAL WEEKLY
Qty: 5 CAPSULE | Refills: 0 | Status: SHIPPED | OUTPATIENT
Start: 2021-12-12 | End: 2021-12-16 | Stop reason: ALTCHOICE

## 2021-12-07 RX ADMIN — IPRATROPIUM BROMIDE 2 PUFF: 17 AEROSOL, METERED RESPIRATORY (INHALATION) at 12:11

## 2021-12-07 RX ADMIN — ALBUTEROL SULFATE 2 PUFF: 90 AEROSOL, METERED RESPIRATORY (INHALATION) at 08:16

## 2021-12-07 RX ADMIN — SODIUM CHLORIDE: 9 INJECTION, SOLUTION INTRAVENOUS at 12:11

## 2021-12-07 RX ADMIN — ENOXAPARIN SODIUM 30 MG: 100 INJECTION SUBCUTANEOUS at 08:17

## 2021-12-07 RX ADMIN — OXYCODONE HYDROCHLORIDE AND ACETAMINOPHEN 500 MG: 500 TABLET ORAL at 08:17

## 2021-12-07 RX ADMIN — BARICITINIB 4 MG: 2 TABLET, FILM COATED ORAL at 08:17

## 2021-12-07 RX ADMIN — FAMOTIDINE 20 MG: 20 TABLET ORAL at 08:17

## 2021-12-07 RX ADMIN — GUAIFENESIN SYRUP AND DEXTROMETHORPHAN 5 ML: 100; 10 SYRUP ORAL at 15:24

## 2021-12-07 RX ADMIN — IPRATROPIUM BROMIDE 2 PUFF: 17 AEROSOL, METERED RESPIRATORY (INHALATION) at 08:18

## 2021-12-07 RX ADMIN — CYANOCOBALAMIN TAB 500 MCG 500 MCG: 500 TAB at 08:17

## 2021-12-07 RX ADMIN — ALBUTEROL SULFATE 2 PUFF: 90 AEROSOL, METERED RESPIRATORY (INHALATION) at 12:12

## 2021-12-07 RX ADMIN — ZINC SULFATE 220 MG (50 MG) CAPSULE 50 MG: CAPSULE at 08:17

## 2021-12-07 RX ADMIN — SODIUM CHLORIDE: 9 INJECTION, SOLUTION INTRAVENOUS at 00:48

## 2021-12-07 RX ADMIN — BUDESONIDE AND FORMOTEROL FUMARATE DIHYDRATE 2 PUFF: 160; 4.5 AEROSOL RESPIRATORY (INHALATION) at 08:16

## 2021-12-07 NOTE — CARE COORDINATION
Spoke with patient regarding MD orders for [facility] services. Patient agreeable and has chosen [facility]. Referral Faxed. [address] [place] [address] [phone]. [phone]. Please notify [facility] when patient discharges and fax DC Summary,  DC med list and any new [facility] orders. The Patient and/or patient representative was provided with a choice of provider and agrees   with the discharge plan. [x] Yes [] No    Freedom of choice list was provided with basic dialogue that supports the patient's individualized plan of care/goals, treatment preferences and shares the quality data associated with the providers.  [x] Yes [] No  Electronically signed by Tania Owens on 12/7/2021 at 2:03 PM

## 2021-12-07 NOTE — PLAN OF CARE
Problem: Airway Clearance - Ineffective  Goal: Achieve or maintain patent airway  12/7/2021 0925 by Jean Marie Mendez RN  Outcome: Ongoing  12/6/2021 2233 by Breanne Luong RN  Outcome: Ongoing     Problem: Gas Exchange - Impaired  Goal: Absence of hypoxia  12/7/2021 0925 by Jean Marie Mendez RN  Outcome: Ongoing  12/6/2021 2233 by Breanne Luong RN  Outcome: Ongoing  Goal: Promote optimal lung function  12/7/2021 0925 by Jean Marie Mendez RN  Outcome: Ongoing  12/6/2021 2233 by Breanne Luong RN  Outcome: Ongoing     Problem: Breathing Pattern - Ineffective  Goal: Ability to achieve and maintain a regular respiratory rate  12/7/2021 0925 by Jean Marie Mendez RN  Outcome: Ongoing  12/6/2021 2233 by Breanne Luong RN  Outcome: Ongoing     Problem:  Body Temperature -  Risk of, Imbalanced  Goal: Ability to maintain a body temperature within defined limits  12/7/2021 0925 by Jean Marie Mendez RN  Outcome: Ongoing  12/6/2021 2233 by Breanne Luong RN  Outcome: Ongoing  Goal: Will regain or maintain usual level of consciousness  12/7/2021 0925 by Jean Marie Mendez RN  Outcome: Ongoing  12/6/2021 2233 by Breanne Luong RN  Outcome: Ongoing  Goal: Complications related to the disease process, condition or treatment will be avoided or minimized  12/7/2021 0925 by Jean Marie Mendez RN  Outcome: Ongoing  12/6/2021 2233 by Breanne Luong RN  Outcome: Ongoing     Problem: Isolation Precautions - Risk of Spread of Infection  Goal: Prevent transmission of infection  12/7/2021 0925 by Jean Marie Mendez RN  Outcome: Ongoing  12/6/2021 2233 by Breanne Luong RN  Outcome: Ongoing     Problem: Nutrition Deficits  Goal: Optimize nutritional status  12/7/2021 0925 by Jean Marie Mendez RN  Outcome: Ongoing  12/6/2021 2233 by Breanne Luong RN  Outcome: Ongoing     Problem: Risk for Fluid Volume Deficit  Goal: Maintain normal heart rhythm  12/7/2021 0925 by Jean Marie Mendez RN  Outcome: Ongoing  12/6/2021 2233 by Mar Navas YU Manzo  Outcome: Ongoing  Goal: Maintain absence of muscle cramping  12/7/2021 0925 by Kiera Cabezas RN  Outcome: Ongoing  12/6/2021 2233 by Bradley Gomes RN  Outcome: Ongoing  Goal: Maintain normal serum potassium, sodium, calcium, phosphorus, and pH  12/7/2021 0925 by Kiera Cabezas RN  Outcome: Ongoing  12/6/2021 2233 by Bradley Gomes RN  Outcome: Ongoing     Problem: Loneliness or Risk for Loneliness  Goal: Demonstrate positive use of time alone when socialization is not possible  12/7/2021 0925 by Kiera Cabezas RN  Outcome: Ongoing  12/6/2021 2233 by Bradley Gomes RN  Outcome: Ongoing     Problem: Fatigue  Goal: Verbalize increase energy and improved vitality  12/7/2021 0925 by Kiera Cabezas RN  Outcome: Ongoing  12/6/2021 2233 by Bradley Gomes RN  Outcome: Ongoing     Problem: Patient Education: Go to Patient Education Activity  Goal: Patient/Family Education  12/7/2021 8335 by Kiera Cabezas RN  Outcome: Ongoing  12/6/2021 2233 by Bradley Gomes RN  Outcome: Ongoing     Problem: Falls - Risk of:  Goal: Will remain free from falls  Description: Will remain free from falls  12/7/2021 0925 by Kiera Cabezas RN  Outcome: Ongoing  12/6/2021 2233 by Bradley Gomes RN  Outcome: Ongoing  Goal: Absence of physical injury  Description: Absence of physical injury  12/7/2021 0925 by Kiera Cabezas RN  Outcome: Ongoing  12/6/2021 2233 by Bradley Gomes RN  Outcome: Ongoing     Problem: Nutrition  Goal: Optimal nutrition therapy  12/7/2021 0925 by Kiera Cabezas RN  Outcome: Ongoing  12/6/2021 2233 by Bradley Gomes RN  Outcome: Ongoing

## 2021-12-07 NOTE — PROGRESS NOTES
Occupational Therapy   Occupational Therapy Initial Assessment  Date: 2021   Patient Name: Florentino Hayes  MRN: [id number]     : 1960    Date of Service: 2021    Discharge Recommendations:          Assessment   Performance deficits / Impairments: Decreased functional mobility ; Decreased endurance; Decreased ADL status; Decreased balance  Assessment: Will progress as tolerated  Treatment Diagnosis: Covid , Respiratory Failure  Prognosis: Good  Decision Making: Low Complexity  REQUIRES OT FOLLOW UP: Yes  Activity Tolerance  Activity Tolerance: Patient Tolerated treatment well           Patient Diagnosis(es): The primary encounter diagnosis was Acute hypoxemic respiratory failure due to COVID-19 [facility]). Diagnoses of Hypotension due to hypovolemia and Chronic obstructive pulmonary disease, unspecified COPD type ([facility] were also pertinent to this visit. has a past medical history of Acid reflux, Bronchitis, and Pneumonia. has a past surgical history that includes Hysterectomy and  section.     Treatment Diagnosis: Covid , Respiratory Failure      Restrictions  Restrictions/Precautions  Restrictions/Precautions: Fall Risk    Subjective   General  Chart Reviewed: Yes  Patient assessed for rehabilitation services?: Yes  Family / Caregiver Present: No  Diagnosis: Covid, respiratory failure  Patient Currently in Pain: Denies  Vital Signs  Temp: 98.1 °F (36.7 °C)  Temp Source: Temporal  Pulse: 76  Heart Rate Source: Monitor  Resp: 18  BP: 86/66  Patient Currently in Pain: Denies  Oxygen Therapy  SpO2: 94 %  O2 Device: Nasal cannula  O2 Flow Rate (L/min): 3 L/min  Social/Functional History  Social/Functional History  Lives With: Spouse  ADL Assistance: Independent  Homemaking Assistance: Independent  Homemaking Responsibilities: Yes  Ambulation Assistance: Independent  Transfer Assistance: Independent  Active : Yes  Occupation: Full time employment       Objective   Vision: Within Functional Limits  Hearing: Within functional limits    Orientation  Overall Orientation Status: Within Normal Limits        ADL  Feeding: Independent  Grooming: Independent  UE Bathing: Supervision  LE Bathing: Supervision  UE Dressing: Supervision  LE Dressing: Supervision  Toileting: Supervision           Transfers  Stand Step Transfers: Contact guard assistance; Stand by assistance  Sit to stand: Supervision  Transfer Comments: No AD. On room air at eval     Cognition  Overall Cognitive Status: WNL                 LUE AROM (degrees)  LUE AROM : WFL  RUE AROM (degrees)  RUE AROM : WFL  LUE Strength  Gross LUE Strength: WFL  RUE Strength  Gross RUE Strength: WFL                   Plan   Plan  Times per week: 3-5x/week  Times per day: Daily    G-Code     OutComes Score                                                  AM-PAC Score             Goals  Short term goals  Time Frame for Short term goals: 1 week  Short term goal 1: Modified I with toilet tfers  Short term goal 2: Modified I with LB dsg.   Long term goals  Long term goal 1: Return to PLOF       Therapy Time   Individual Concurrent Group Co-treatment   Time In           Time Out           Minutes                   Mansi Platt, OT

## 2021-12-07 NOTE — PROGRESS NOTES
Physical Therapy     12/07/21 1422   Restrictions/Precautions   Restrictions/Precautions Fall Risk   General   Chart Reviewed Yes   Subjective   Subjective Pt. agrees to work with therapy. Pain Screening   Patient Currently in Pain Denies   Bed Mobility   Supine to Sit Stand by assistance   Sit to Supine Stand by assistance   Scooting Stand by assistance   Transfers   Sit to Stand Stand by assistance   Stand to sit Stand by assistance   Ambulation   Ambulation? Yes   Ambulation 1   Surface level tile   Device No Device   Assistance Stand by assistance   Quality of Gait NO LOB   Gait Deviations Slow Yaritza; Decreased step length   Distance 200'   Balance   Sitting - Dynamic Good   Exercises   Hip Flexion 1 x 20 BLE    Knee Long Arc Quad 1 x 20 BLE    Ankle Pumps 1 x 20 BLE    Short term goals   Time Frame for Short term goals 14 DAYS   Short term goal 1 TRANSFERS INDEPENDENT   Short term goal 2 ' SUP-IND   Conditions Requiring Skilled Therapeutic Intervention   Body structures, Functions, Activity limitations Decreased functional mobility ; Decreased endurance; Decreased balance   Assessment Pt would benefit from cont. skilled PT to increase strength, coordination, and endurance. Activity Tolerance   Activity Tolerance Patient Tolerated treatment well   Safety Devices   Type of devices Call light within reach;  Left in bed; Bed alarm in place   Delano Bear PTA Student

## 2021-12-07 NOTE — CARE COORDINATION
CM outreached to Patient, who reports being a little better. Pt continues on 3 L 02 NC, and does not wear 02 at baseline. Pt is working at [employer], but had taken a \"leave of absence\" to take of her . Pt then became ill herself. Pt reports, ex daughter in law lives with her and will be able to assist with her care. Per pt spouse will be going to SNF. CM will continue to follow for home care needs.

## 2021-12-07 NOTE — PLAN OF CARE
Problem: Airway Clearance - Ineffective  Goal: Achieve or maintain patent airway  Outcome: Ongoing     Problem: Gas Exchange - Impaired  Goal: Absence of hypoxia  Outcome: Ongoing  Goal: Promote optimal lung function  Outcome: Ongoing     Problem: Breathing Pattern - Ineffective  Goal: Ability to achieve and maintain a regular respiratory rate  Outcome: Ongoing     Problem: Isolation Precautions - Risk of Spread of Infection  Goal: Prevent transmission of infection  Outcome: Ongoing

## 2021-12-07 NOTE — DISCHARGE SUMMARY
[place], [place], [address]    DEPARTMENT OF HOSPITALIST MEDICINE      DISCHARGE SUMMARY:      PATIENT NAME:  Gabriel Yun  :  1960  MRN:  [id number]    Admission Date:   2021  6:00 PM Attending: Jonnie Sebastian MD   Discharge Date:   2021              PCP: . None (Inactive)  Length of Stay: 3 days     Chief Complaint on Admission:   Chief Complaint   Patient presents with    Headache     X 4 DAYS.  Fever    Diarrhea       Consultants:     IP CONSULT TO PHARMACY  IP CONSULT TO PULMONOLOGY  IP CONSULT TO CASE MANAGEMENT  PALLIATIVE CARE EVAL  IP CONSULT TO HOME CARE NEEDS       Discharge Problem List:   Principal Problem:    COVID-19  Active Problems:    Chronic sinusitis    Chronic night sweats    Unexplained weight loss    Vitamin D deficiency  Resolved Problems:    Acute hypoxemic respiratory failure due to COVID-19 [facility])    Hyponatremia    CUMULATIVE  HOSPITAL  COURSE  AND  TREATMENT:    2021  Patient is feeling better today. Shortness of breath has improved. Her labs and electrolytes are stable. She is not requiring any oxygen. Home O2 evaluation was done which she passed. She is feeling better and wants to go home. Her CRP and D-dimer levels are slowly downtrending. We will discontinue her dexamethasone, and will prescribe her Eliquis as per COVID-19 discharge protocol. He will be advised to self quarantine at home and establish with Myah Parrish for further management recommendations regarding her COVID-19. She is being discharged home with home health care. 2021  Patient is requiring 2 to 3 L oxygen via nasal cannula to maintain O2 sats in 90s. Slowly improving clinically. Encourage patient to ambulate. Pulmonary evaluated the patient and feel she should be ready to be discharged by tomorrow.     2021  Patient responding well to oxygen via nasal cannula and maintaining O2 sats in 90s.   He is an ex-smoker and complaining of night sweats and weight last for many months hence pulmonary consult given for evaluation. Systolic blood pressure staying below 100 hence patient started on IV fluids. Patient has severely decreased vitamin D level of 12.6 hence patient started on vitamin D 50,000 units weekly.     12/4/2021  History Of Present Illness: The patient is a 61 y.o. female who presented to [facility] ER with PMH acid reflux, bronchitis, COPD, emphysema, complaining of worsening weakness and fatigue.  Patient states that she has felt fatigued for the last 4 days, has had a fever max of 101.8, and has been having nausea and diarrhea.  She took ibuprofen for fever and it did resolve.  She has had little to no appetite and came in due to worsening weakness.  She denies chills, abdominal pain, and chest pain.  She states she is getting dyspneic on exertion. Work up in [address] [place] [address] Ave interstitial disease, no acute infiltrations.  CT head no acute intracranial abnormality.   and  troponin negative. Blood cultures pending. Received 1L LR bolus.  Patient has not been vaccinated for [employer] [place] is to be admitted to hospital service due to acute hypoxemic respiratory failure due to Covid.     OBJECTIVE:  BP 83/60   Pulse 89   Temp 98.9 °F (37.2 °C) (Oral)   Resp 16   Ht 5' 4\" (1.626 m)   Wt 82 lb 7 oz (37.4 kg)   SpO2 97%   BMI 14.15 kg/m²       Heart: RRR   Lungs: Bilateral decreased air entry, added bilateral rhonchi   Abdomen: Soft, non-tender   Extremities: No edema   Neurologic: Alert and oriented   Skin: Warm and dry          Laboratory Data:  Recent Labs     12/05/21  0341 12/06/21  0608 12/07/21  0039   WBC 6.6 7.2 7.8   HGB 13.5 14.7 13.5    177 147     Recent Labs     12/05/21  0341 12/06/21  0608 12/07/21  0039    140 139   K 4.0 4.6 4.2    103 100   CO2 24 25 25   BUN 24* 14 14   CREATININE 0.5 0.6 0.6   GLUCOSE 127* 105 177*     Recent Labs     12/04/21  1902 12/07/21  0039   AST 34* 31   ALT 18 15   BILITOT 0.3 pneumonia, I would request the PCP to repeat a Chest X-Ray, PA & Lateral views or CT scan of chest without contrast, in 6-8 weeks to follow-up and document resolution of infiltrates, and to make sure we are not missing a potential pulmonary tumor. Condition on Discharge: gradually improving  Discharge Disposition: Home with [address] [place] [address]    Recommended Follow Up:  No follow-up provider specified. Followup Appointments Scheduled at Time of Discharge:  No future appointments. Discharge Instructions:   Please see the discharge paperwork. Patient was seen at bedside today, and the examination shows improvement since yesterday. Detailed discharge directions delivered to the patient by myself and our nursing staff, who verbalizes understanding and is very happy and satisfied with the plan. Patient has been advised to continue all medications as prescribed and advised, and f/u with PCP within 1 week. Patient is stable from medical standpoint to be discharged. Total time spent during patient evaluation and assessment, discussion with the nurse/family, addressing discharge medications/scripts and coordination of care for safe discharge was in excess of 35 minutes.       Signed Electronically:    Emily Silvestre MD  3:13 PM 12/7/2021

## 2021-12-08 ENCOUNTER — CARE COORDINATION (OUTPATIENT)
Dept: CASE MANAGEMENT | Age: 61
End: 2021-12-08

## 2021-12-08 DIAGNOSIS — U07.1 COVID-19: Primary | ICD-10-CM

## 2021-12-08 ASSESSMENT — ENCOUNTER SYMPTOMS
DIARRHEA: 1
NAUSEA: 1
SHORTNESS OF BREATH: 0

## 2021-12-08 NOTE — ED PROVIDER NOTES
[facility] 4 ONCOLOGY UNIT  eMERGENCY dEPARTMENT eNCOUnter      Pt Name: Dejon Oleary  MRN: [id number]  [place] 1960  Date of evaluation: 2021  Provider: Tawana Trent MD    CHIEF COMPLAINT       Chief Complaint   Patient presents with    Headache     X 4 DAYS.  Fever    Diarrhea         HISTORY OF PRESENT ILLNESS   (Location/Symptom, Timing/Onset,Context/Setting, Quality, Duration, Modifying Factors, Severity)  Note limiting factors. Dejon Oleary is a 64 y.o. female who presents to the emergency department with feeling ill x 4 days. Fever headache diarrhea. Standing up makes her dizzy. bp low not eating drinking well    No covid vaccine. No severe sob has bad copd. The history is provided by the patient. NursingNotes were reviewed. REVIEW OF SYSTEMS    (2-9 systems for level 4, 10 or more for level 5)     Review of Systems   Constitutional: Positive for fever. Respiratory: Negative for shortness of breath. Cardiovascular: Negative for chest pain. Gastrointestinal: Positive for diarrhea and nausea. Neurological: Positive for weakness. Psychiatric/Behavioral: Negative for confusion. A complete review of systems was performed and is negative except as noted above in the HPI.        PAST MEDICAL HISTORY     Past Medical History:   Diagnosis Date    Acid reflux     Bronchitis     Pneumonia     pt states she has an old spont on her lung due past pneumonia         SURGICAL HISTORY       Past Surgical History:   Procedure Laterality Date     SECTION      HYSTERECTOMY           CURRENT MEDICATIONS       Discharge Medication List as of 2021  3:21 PM      CONTINUE these medications which have NOT CHANGED    Details   albuterol sulfate HFA (VENTOLIN HFA) 108 (90 Base) MCG/ACT inhaler Inhale 2 puffs into the lungs 4 times daily as needed for Wheezing, Disp-1 Inhaler, R-0Print      ibuprofen (ADVIL;MOTRIN) 200 MG tablet Take 400 mg by mouth every 6 hours as needed for PainHistorical Med      vitamin C (ASCORBIC ACID) 500 MG tablet Take 500 mg by mouth dailyHistorical Med      ranitidine (ZANTAC) 150 MG capsule Take 150 mg by mouth 2 times dailyHistorical Med      vitamin B-12 (CYANOCOBALAMIN) 500 MCG tablet Take 500 mcg by mouth dailyHistorical Med             ALLERGIES     Lortab [hydrocodone-acetaminophen]    FAMILY HISTORY       Family History   Problem Relation Age of Onset    Cancer Mother     Heart Disease Father           SOCIAL HISTORY       Social History     Socioeconomic History    Marital status:      Spouse name: None    Number of children: None    Years of education: None    Highest education level: None   Occupational History    None   Tobacco Use    Smoking status: Former Smoker     Packs/day: 1.00     Types: Cigarettes    Smokeless tobacco: Never Used    Tobacco comment: qUIT mARCH 2020   Vaping Use    Vaping Use: Never used   Substance and Sexual Activity    Alcohol use: No    Drug use: No    Sexual activity: None   Other Topics Concern    None   Social History Narrative    None     Social Determinants of Health     Financial Resource Strain:     Difficulty of Paying Living Expenses: Not on file   Food Insecurity:     Worried About Running Out of Food in the Last Year: Not on file    Randy of Food in the Last Year: Not on file   Transportation Needs:     Lack of Transportation (Medical): Not on file    Lack of Transportation (Non-Medical):  Not on file   Physical Activity:     Days of Exercise per Week: Not on file    Minutes of Exercise per Session: Not on file   Stress:     Feeling of Stress : Not on file   Social Connections:     Frequency of Communication with Friends and Family: Not on file    Frequency of Social Gatherings with Friends and Family: Not on file    Attends Christian Services: Not on file    Active Member of Clubs or Organizations: Not on file    Attends Club or Organization Meetings: Not on file    Marital Status: Not on file   Intimate Partner Violence:     Fear of Current or Ex-Partner: Not on file    Emotionally Abused: Not on file    Physically Abused: Not on file    Sexually Abused: Not on file   Housing Stability:     Unable to Pay for Housing in the Last Year: Not on file    Number of [place] in the Last Year: Not on file    Unstable Housing in the Last Year: Not on file       SCREENINGS    [place] Coma Scale  Eye Opening: Spontaneous  Best Verbal Response: Oriented  Best Motor Response: Obeys commands  [place] Coma Scale Score: 15        PHYSICAL EXAM    (up to 7 for level 4, 8 or more for level 5)     ED Triage Vitals   BP Temp Temp Source Pulse Resp SpO2 Height Weight   12/04/21 1759 12/04/21 1758 12/04/21 1758 12/04/21 1758 12/04/21 1758 12/04/21 1758 12/04/21 1758 12/04/21 1758   (!) 78/41 98.6 °F (37 °C) Oral 89 16 93 % 5' 4\" (1.626 m) 87 lb (39.5 kg)       Physical Exam  Vitals and nursing note reviewed. Constitutional:       Comments: Weak frail thin    HENT:      Head: Normocephalic. Eyes:      Extraocular Movements: Extraocular movements intact. Pupils: Pupils are equal, round, and reactive to light. Cardiovascular:      Rate and Rhythm: Normal rate. Pulses: Normal pulses. Pulmonary:      Effort: Pulmonary effort is normal.      Breath sounds: Wheezing present. Abdominal:      General: Abdomen is flat. Tenderness: There is no abdominal tenderness. Musculoskeletal:         General: No tenderness. Normal range of motion. Cervical back: Normal range of motion and neck supple. Skin:     General: Skin is warm and dry. Neurological:      General: No focal deficit present. Mental Status: She is alert and oriented to person, place, and time.    Psychiatric:         Mood and Affect: Mood normal.         Behavior: Behavior normal.         DIAGNOSTIC RESULTS     EKG: All EKG's are interpreted by the Emergency Department Physician who components within normal limits   C-REACTIVE PROTEIN - Abnormal; Notable for the following components:    CRP 4.08 (*)     All other components within normal limits   D-DIMER, QUANTITATIVE - Abnormal; Notable for the following components:    D-Dimer, Quant 0.71 (*)     All other components within normal limits   PROCALCITONIN - Abnormal; Notable for the following components:    Procalcitonin 0.10 (*)     All other components within normal limits   CBC - Abnormal; Notable for the following components:    MCHC 31.4 (*)     All other components within normal limits   BASIC METABOLIC PANEL W/ REFLEX TO MG FOR LOW K - Abnormal; Notable for the following components:    Glucose 127 (*)     BUN 24 (*)     Calcium 8.7 (*)     All other components within normal limits   VITAMIN D 25 HYDROXY - Abnormal; Notable for the following components:    Vit D, 25-Hydroxy 12.6 (*)     All other components within normal limits   MICROSCOPIC URINALYSIS - Abnormal; Notable for the following components:    Bacteria, UA NEGATIVE (*)     Crystals, UA NEG (*)     Hyaline Casts, UA 10 (*)     All other components within normal limits   C-REACTIVE PROTEIN - Abnormal; Notable for the following components:    CRP 2.34 (*)     All other components within normal limits    Narrative:     Collection has been rescheduled by [facility] at 12/06/2021 05:30 Reason:   Patient in restroom  Collection has been rescheduled by [facility] at 12/06/2021 05:58 Reason:   Failed attempt at venipuncture   D-DIMER, QUANTITATIVE - Abnormal; Notable for the following components:    D-Dimer, Quant 0.55 (*)     All other components within normal limits    Narrative:     Collection has been rescheduled by [facility] at 12/06/2021 05:30 Reason:   Patient in restroom  Collection has been rescheduled by [facility] at 12/06/2021 05:58 Reason:   Failed attempt at venipuncture   CBC - Abnormal; Notable for the following components:    MCHC 31.5 (*)     All other components within normal limits Narrative:     Collection has been rescheduled by [facility] at 12/06/2021 05:30 Reason:   Patient in restroom  Collection has been rescheduled by [facility] at 12/06/2021 05:58 Reason:   Failed attempt at venipuncture   [address] TO MG FOR LOW K - Abnormal; Notable for the following components:    Calcium 8.6 (*)     All other components within normal limits    Narrative:     Collection has been rescheduled by [facility] at 12/06/2021 05:30 Reason:   Patient in restroom  Collection has been rescheduled by [facility] at 12/06/2021 05:58 Reason:   Failed attempt at venipuncture   PROCALCITONIN - Abnormal; Notable for the following components:    Procalcitonin 0.15 (*)     All other components within normal limits   C-REACTIVE PROTEIN - Abnormal; Notable for the following components:    CRP 3.91 (*)     All other components within normal limits   CBC WITH AUTO DIFFERENTIAL - Abnormal; Notable for the following components:    MCHC 32.5 (*)     Neutrophils % 86.5 (*)     Lymphocytes % 10.1 (*)     Lymphocytes Absolute 0.8 (*)     All other components within normal limits   COMPREHENSIVE METABOLIC PANEL W/ REFLEX TO MG FOR LOW K - Abnormal; Notable for the following components:    Glucose 177 (*)     Calcium 8.5 (*)     Total Protein 4.9 (*)     Albumin 3.1 (*)     All other components within normal limits   CULTURE, BLOOD 1    Narrative:     ORDER#: [id number]                          ORDERED BY: ZEN HAWTHORNE  SOURCE: Blood RAC                          COLLECTED:  12/04/21 19:00  ANTIBIOTICS AT ROMMEL.:                      RECEIVED :  12/04/21 19:06   CULTURE, BLOOD 2    Narrative:     ORDER#: [id number]                          ORDERED BY: ZEN HAWTHORNE  SOURCE: Blood                              COLLECTED:  12/04/21 19:03  ANTIBIOTICS AT ROMMEL.:                      RECEIVED :  12/04/21 19:06   PROTIME-INR   TROPONIN   LACTIC ACID, PLASMA   MAGNESIUM   PHOSPHORUS   D-DIMER, QUANTITATIVE       All other labs were within normal range or not returned as of this dictation. EMERGENCY DEPARTMENT COURSE and DIFFERENTIALDIAGNOSIS/MDM:   Vitals:    Vitals:    12/07/21 0534 12/07/21 0815 12/07/21 1027 12/07/21 1430   BP: (!) 76/48 (!) 89/57 86/66 83/60   Pulse: 53 64 76 89   Resp: 16  18 16   Temp: 96.2 °F (35.7 °C)  98.1 °F (36.7 °C) 98.9 °F (37.2 °C)   TempSrc: Temporal  Oral Oral   SpO2: 98%  94% 97%   Weight:       Height:           MDM  Number of Diagnoses or Management Options  Acute hypoxemic respiratory failure due to COVID-19 [facility]): new and requires workup  Chronic obstructive pulmonary disease, unspecified COPD type ([facility]): new and requires workup  Hypotension due to hypovolemia: new and requires workup  Diagnosis management comments: Pt with low sat    Copd    Nowcovid    Also low bop and ortho static    Admit for covid and other issues        Amount and/or Complexity of Data Reviewed  Clinical lab tests: ordered and reviewed  Tests in the radiology section of CPT®: ordered and reviewed    Risk of Complications, Morbidity, and/or Mortality  Presenting problems: high    Patient Progress  Patient progress: improved        CONSULTS:  IP CONSULT TO PHARMACY  IP CONSULT TO PULMONOLOGY  IP CONSULT TO CASE MANAGEMENT  PALLIATIVE CARE EVAL  IP CONSULT TO HOME CARE NEEDS    PROCEDURES:  Unless otherwise notedbelow, none     Procedures    FINAL IMPRESSION     1. Acute hypoxemic respiratory failure due to COVID-19 ([facility]    2. Hypotension due to hypovolemia    3. Chronic obstructive pulmonary disease, unspecified COPD type ([facility]          DISPOSITION/PLAN   DISPOSITION Admitted 12/04/2021 09:06:45 PM      PATIENT REFERRED TO:  No follow-up provider specified.     DISCHARGE MEDICATIONS:  Discharge Medication List as of 12/7/2021  3:21 PM      START taking these medications    Details   budesonide-formoterol (SYMBICORT) 160-4.5 MCG/ACT AERO Inhale 2 puffs into the lungs 2 times daily, Disp-10.2 g, R-0Normal      vitamin D (ERGOCALCIFEROL) 1.25 MG (26507 UT) CAPS capsule Take 1 capsule by mouth once a week, Disp-5 capsule, R-0Normal      apixaban (ELIQUIS) 2.5 MG TABS tablet Take 1 tablet by mouth 2 times daily, Disp-60 tablet, R-0Normal                (Please note that portions of this note were completed with a voice recognition program.  Efforts were made to edit the dictations butoccasionally words are mis-transcribed.)    Almaz Calderon MD (electronically signed)  AttendingEmergency Physician          Almaz Calderon MD  12/08/21 1435

## 2021-12-08 NOTE — CARE COORDINATION
Robert 45 Transitions Initial Follow Up Call    Call within 2 business days of discharge: Yes    Patient: Hoang Cross Patient : 1960   MRN: [id number]  Reason for Admission: Covid  Discharge Date: 21 RARS: Readmission Risk Score: 11.5 ( )      Last Discharge [facility]       Complaint Diagnosis Description Type Department Provider    21 Headache; Fever; Diarrhea Acute hypoxemic respiratory failure due to COVID-19 [facility]) . .. ED to Hosp-Admission (Discharged) (ADMITTED) Ko Mcgovern MD; Antione Yanez,... Spoke with: Georgie 81: [address]    Non-face-to-face services provided:  Reviewed encounter information for continuity of care prior to follow up phone call - chart notes, consults, progress notes, test results, med list, appointments, AVS, other information. Advance Care Planning   Healthcare Decision Maker:    Primary Decision Maker: Kacie Rosana - Spouse - [phone]    Primary Decision Maker: Courtney Walker - Child - [phone]    Supplemental (Other) Decision Maker: Virginia Herzog - Other - [phone]    Patient contacted regarding COVID-19 diagnosis. Discussed COVID-19 related testing which was available at this time. Test results were positive. Patient informed of results, if available? Yes. Care Transition Nurse contacted the patient by telephone to perform post discharge assessment. Call within 2 business days of discharge: Yes. Verified name and  with patient as identifiers. Provided introduction to self, and explanation of the CTN/ACM role, and reason for call due to risk factors for infection and/or exposure to COVID-19. Symptoms reviewed with patient who verbalized the following symptoms: no new symptoms and no worsening symptoms. Due to new onset of symptoms encounter was not routed to provider for escalation. Discussed follow-up appointments.  If no appointment was previously keep you well at home?: Yes  Care Transitions Interventions         Follow Up  : Spoke with patient today for initial Covid call after discharge from [facility]. She says she is doing fair today. She does not have oxygen, reading on her 's pulse ox is 97% on RA. She says appetite is ok, eating and drinking. She has someone picking up her medications, did review them. She is not SOA, not coughing. 1111f order added. She has HFU with new PCP on . She has [address] but they most likely will not come to see her til after the new PCP signs off on [place] orders. CTN did place call to [facility] and spoke with Thomasina Apgar, and gave her patient's new PCP name and she is relaying message with info to [facility]. Patient  is inpatient, most likely going to SNF she says for rehab. She has not had the Covid vaccine, listed her PHCDM, and does have LW, not on chart, and needs updating she says. Discussed Covid calls and follow up and she is accepting. Will follow up with her at a later time.    Future Appointments   Date Time Provider Nicolas Jaeger   2021 11:00 AM [facility] [place]

## 2021-12-09 LAB
BLOOD CULTURE, ROUTINE: NORMAL
CULTURE, BLOOD 2: NORMAL

## 2021-12-14 ENCOUNTER — CARE COORDINATION (OUTPATIENT)
Dept: CASE MANAGEMENT | Age: 61
End: 2021-12-14

## 2021-12-14 NOTE — CARE COORDINATION
[facility] 45 Transitions Follow Up Call    2021    Patient: Jaquelin Gutierrez  Patient : 1960   MRN: <[id number]>  Reason for Admission: Covid  Discharge Date: 21 RARS: Readmission Risk Score: 11.5 ( )         Spoke with: NA    Attempted to reach patient via phone for transition call. VM left stating purpose of call along with my contact information requesting a return call. Roderick Gee LPN [facility]  Care Transitions  [phone]    Care Transitions Subsequent and Final Call    Subsequent and Final Calls  Care Transitions Interventions  Other Interventions:            Follow Up  Future Appointments   Date Time Provider Nicolas Jaeger   2021 11:00 AM MD VANDANA Thurman [place]       Roderick Gee LPN

## 2021-12-16 ENCOUNTER — TELEPHONE (OUTPATIENT)
Dept: INTERNAL MEDICINE CLINIC | Age: 61
End: 2021-12-16

## 2021-12-16 ENCOUNTER — OFFICE VISIT (OUTPATIENT)
Dept: PRIMARY CARE CLINIC | Age: 61
End: 2021-12-16
Payer: COMMERCIAL

## 2021-12-16 VITALS
OXYGEN SATURATION: 96 % | SYSTOLIC BLOOD PRESSURE: 90 MMHG | HEIGHT: 64 IN | HEART RATE: 80 BPM | DIASTOLIC BLOOD PRESSURE: 60 MMHG | TEMPERATURE: 97.2 F | WEIGHT: 84.4 LBS | BODY MASS INDEX: 14.41 KG/M2

## 2021-12-16 DIAGNOSIS — R63.6 UNDERWEIGHT: ICD-10-CM

## 2021-12-16 DIAGNOSIS — U09.9 POST-COVID CHRONIC COUGH: ICD-10-CM

## 2021-12-16 DIAGNOSIS — J43.9 PULMONARY EMPHYSEMA, UNSPECIFIED EMPHYSEMA TYPE (HCC): ICD-10-CM

## 2021-12-16 DIAGNOSIS — Z00.00 ENCOUNTER FOR MEDICAL EXAMINATION TO ESTABLISH CARE: Primary | ICD-10-CM

## 2021-12-16 DIAGNOSIS — R05.3 POST-COVID CHRONIC COUGH: ICD-10-CM

## 2021-12-16 PROCEDURE — 1111F DSCHRG MED/CURRENT MED MERGE: CPT | Performed by: FAMILY MEDICINE

## 2021-12-16 PROCEDURE — 99204 OFFICE O/P NEW MOD 45 MIN: CPT | Performed by: FAMILY MEDICINE

## 2021-12-16 SDOH — ECONOMIC STABILITY: FOOD INSECURITY: WITHIN THE PAST 12 MONTHS, THE FOOD YOU BOUGHT JUST DIDN'T LAST AND YOU DIDN'T HAVE MONEY TO GET MORE.: NEVER TRUE

## 2021-12-16 SDOH — ECONOMIC STABILITY: FOOD INSECURITY: WITHIN THE PAST 12 MONTHS, YOU WORRIED THAT YOUR FOOD WOULD RUN OUT BEFORE YOU GOT MONEY TO BUY MORE.: NEVER TRUE

## 2021-12-16 ASSESSMENT — PATIENT HEALTH QUESTIONNAIRE - PHQ9
1. LITTLE INTEREST OR PLEASURE IN DOING THINGS: 0
SUM OF ALL RESPONSES TO PHQ9 QUESTIONS 1 & 2: 0
2. FEELING DOWN, DEPRESSED OR HOPELESS: 0
SUM OF ALL RESPONSES TO PHQ QUESTIONS 1-9: 0

## 2021-12-16 ASSESSMENT — SOCIAL DETERMINANTS OF HEALTH (SDOH): HOW HARD IS IT FOR YOU TO PAY FOR THE VERY BASICS LIKE FOOD, HOUSING, MEDICAL CARE, AND HEATING?: NOT HARD AT ALL

## 2021-12-16 ASSESSMENT — ENCOUNTER SYMPTOMS
COUGH: 1
GASTROINTESTINAL NEGATIVE: 1

## 2021-12-16 NOTE — PROGRESS NOTES
Lortab [Hydrocodone-Acetaminophen] Other (See Comments)       Medications:  Current Outpatient Medications   Medication Sig Dispense Refill    albuterol sulfate HFA (VENTOLIN HFA) 108 (90 Base) MCG/ACT inhaler Inhale 2 puffs into the lungs 4 times daily as needed for Wheezing 1 Inhaler 0     No current facility-administered medications for this visit. Objective:   PE:  BP 90/60   Pulse 80   Temp 97.2 °F (36.2 °C)   Ht 5' 4\" (1.626 m)   Wt 84 lb 6.4 oz (38.3 kg)   SpO2 96%   BMI 14.49 kg/m²   Physical Exam  Vitals and nursing note reviewed. Constitutional:       Appearance: She is underweight. Comments: looks older than stated age   HENT:      Head: Normocephalic. Nose: No congestion. Mouth/Throat:      Mouth: Mucous membranes are moist.   Eyes:      Pupils: Pupils are equal, round, and reactive to light. Cardiovascular:      Heart sounds: Normal heart sounds. Pulmonary:      Breath sounds: Decreased air movement present. Decreased breath sounds present. No wheezing, rhonchi or rales. Abdominal:      General: Abdomen is flat. Palpations: Abdomen is soft. Tenderness: There is no abdominal tenderness. Musculoskeletal:         General: No swelling. Cervical back: Neck supple. Skin:     General: Skin is warm. Findings: No lesion or rash. Neurological:      Mental Status: She is alert and oriented to person, place, and time. Chuy Horner was seen today for new patient and follow-up from hospital.    Diagnoses and all orders for this visit:    Encounter for medical examination to establish care  -     Lipid, Fasting; Future  -     Hepatitis C Antibody; Future    Post-COVID chronic cough    Pulmonary emphysema, unspecified emphysema type (HCC)    Underweight    Increase caloric intake - okay to supplement with Boost or Ensure.   Offered flu vaccine  - pt will get this from her work at [employer] Pneumonia vaccine - pt states she would like to wait to get this  Recommend Covid vaccine    Return in 4 weeks (on 1/13/2022). All questions were answered. Medications, including possible adverse effects, and instructions were reviewed and  understanding was confirmed. Follow-up recommendations, including when to contact or return to office (ie; if symptoms worsen or fail to improve), were discussed and acknowledged. Electronically signed by Ann Fernandes MD on 12/16/21 at 11:11 AM CST     Post-Discharge Transitional Care Management Services or Hospital Follow Up      Sheri Ferreira   YOB: 1960    Date of Office Visit:  12/16/2021  Date of Hospital Admission: 12/4/21  Date of Hospital Discharge: 12/7/21  Risk of hospital readmission (high >=14%. Medium >=10%) :Readmission Risk Score: 11.5 ( )      Care management risk score Rising risk (score 2-5) and Complex Care (Scores >=6): 1     Non face to face  following discharge, date last encounter closed (first attempt may have been earlier): 12/8/2021 11:08 AM    Call initiated 2 business days of discharge: Yes    Patient Active Problem List   Diagnosis    Acid reflux    Chronic sinusitis    Chronic night sweats    Unexplained weight loss    Vitamin D deficiency    COVID-19       Allergies   Allergen Reactions    Lortab [Hydrocodone-Acetaminophen] Other (See Comments)       Medications listed as ordered at the time of discharge from hospital     Medication List          Accurate as of December 16, 2021  2:13 PM. If you have any questions, ask your nurse or doctor.             CONTINUE taking these medications    albuterol sulfate  (90 Base) MCG/ACT inhaler  Commonly known as: Ventolin HFA  Inhale 2 puffs into the lungs 4 times daily as needed for Wheezing        STOP taking these medications    budesonide-formoterol 160-4.5 MCG/ACT Aero  Commonly known as: SYMBICORT  Stopped by: Ann Fernandes MD     ibuprofen 200 MG tablet  Commonly known as: DISCHARGE MEDS RECONCILED W/ CURRENT OUTPATIENT MED LIST    3. Pulmonary emphysema, unspecified emphysema type ([facility]      4.  Underweight          Medical Decision Making: low complexity

## 2021-12-16 NOTE — TELEPHONE ENCOUNTER
[address] [address] has referral from 12/7 from [facility] rehab however pt had not been there yet      Will Dr Humera Salgado now follow pt for Guan[place]ito Holley?

## 2021-12-17 ENCOUNTER — TELEPHONE (OUTPATIENT)
Dept: INTERNAL MEDICINE CLINIC | Age: 61
End: 2021-12-17

## 2021-12-17 NOTE — TELEPHONE ENCOUNTER
Per Home Health PT:   Ms. [facility] declined PT [facility] for today. She did not answer her phone, she did answer a text. When asked if she wants to be admitted next week, she did not answer. When asked again, she did not answer. Will you please ask Dr. Alphonso Funk if she has recovered enough that she does not need home care at this time, or if she is so sick she does not want to be bothered right now? Also, have her COVID precautions been discontinued?

## 2021-12-20 NOTE — TELEPHONE ENCOUNTER
Pt made contact with [facility] PT and declined [facility] and stated she was doing well and did not need HH PT

## 2021-12-21 ENCOUNTER — CARE COORDINATION (OUTPATIENT)
Dept: CASE MANAGEMENT | Age: 61
End: 2021-12-21

## 2022-01-13 ENCOUNTER — TELEPHONE (OUTPATIENT)
Dept: PRIMARY CARE CLINIC | Age: 62
End: 2022-01-13

## 2022-01-13 NOTE — TELEPHONE ENCOUNTER
----- Message from Delmy Noguera sent at 1/13/2022  2:15 PM CST -----  Subject: Message to Provider    QUESTIONS  Information for Provider? PT would like to know if she can get a note sent   over for her to return to work on Wednesday 1/19/2022 ; PT would like a   call back when this is ready. ---------------------------------------------------------------------------  --------------  Roderick CHA  What is the best way for the office to contact you? OK to leave message on   voicemail  Preferred Call Back Phone Number? [phone]  ---------------------------------------------------------------------------  --------------  SCRIPT ANSWERS  Relationship to Patient?  Self

## 2022-11-10 ENCOUNTER — HOSPITAL ENCOUNTER (EMERGENCY)
Facility: HOSPITAL | Age: 62
Discharge: HOME | End: 2022-11-10
Attending: FAMILY MEDICINE | Admitting: FAMILY MEDICINE

## 2022-11-10 VITALS
BODY MASS INDEX: 16.39 KG/M2 | TEMPERATURE: 98 F | DIASTOLIC BLOOD PRESSURE: 74 MMHG | WEIGHT: 96 LBS | HEART RATE: 89 BPM | RESPIRATION RATE: 20 BRPM | HEIGHT: 64 IN | OXYGEN SATURATION: 96 % | SYSTOLIC BLOOD PRESSURE: 113 MMHG

## 2022-11-10 DIAGNOSIS — J10.1 INFLUENZA A: Primary | ICD-10-CM

## 2022-11-10 LAB
FLUAV RNA RESP QL NAA+PROBE: DETECTED
FLUBV RNA RESP QL NAA+PROBE: NOT DETECTED
RSV RNA NPH QL NAA+NON-PROBE: NOT DETECTED
SARS-COV-2 RNA RESP QL NAA+PROBE: NOT DETECTED

## 2022-11-10 PROCEDURE — 99283 EMERGENCY DEPT VISIT LOW MDM: CPT

## 2022-11-10 PROCEDURE — C9803 HOPD COVID-19 SPEC COLLECT: HCPCS

## 2022-11-10 PROCEDURE — 87637 SARSCOV2&INF A&B&RSV AMP PRB: CPT | Performed by: FAMILY MEDICINE

## 2022-11-10 RX ORDER — LANOLIN ALCOHOL/MO/W.PET/CERES
1000 CREAM (GRAM) TOPICAL DAILY
COMMUNITY

## 2022-11-11 NOTE — DISCHARGE INSTRUCTIONS
Follow up with one of the [facility] physician groups below to setup primary care. If you have trouble making an appointment, please call the [facility] Nurse Line at [phone]    [facility] Primary Care - [address]  [id number] [phone]    [facility] Internal Medicine - [address], [place], [place] [id number] [phone]    [facility] Family & Internal Medicine - [address] [address], [address], [place] [address] [phone]     [facility] Primary Care ([facility]) - [address] [facility], [address], [place] [address] [phone]    [facility] Primary Care - [address], [address] [phone]    [facility] Family Medicine - [address], [place] [address] [phone]    [facility] Family Medicine - [place]  [address] [place], [place], [address] [phone]    [facility] Family Medicine - [place]  [address], [address] [phone]    [facility] Primary Care - [address] [address] [phone]    [facility] Family Medicine - [place]  [address] [place] [address], [place], [address] [phone]        PEDIATRIC:    [facility] Pediatrics - [address] [address], [address], [place] [address] [phone]

## 2022-11-11 NOTE — ED PROVIDER NOTES
Subjective   History of Present Illness  This 62-year-old female emergency room presents with flulike symptoms going on for the last few days.  Patient states that she has had a fever at home for the last 3 days.  Patient states that she has felt warm to touch but has not actually checked her temperature.  Patient states that she is also been having cold chills.  Patient also states that she has a nonproductive cough.  Patient states that she has fatigue.  Patient states that she has body aches.  Patient denies any shortness of breath.  Patient denies any nausea vomiting.  Patient denies any diarrhea.  Patient denies any other symptoms at this time        Review of Systems   Constitutional: Positive for chills, fatigue and fever.   Respiratory: Positive for cough.    All other systems reviewed and are negative.      Past Medical History:   Diagnosis Date   • Pneumonia    • Torn rotator cuff        No Known Allergies    Past Surgical History:   Procedure Laterality Date   •  SECTION     • HYSTERECTOMY         Family History   Problem Relation Age of Onset   • Cancer Mother    • Heart disease Father        Social History     Socioeconomic History   • Marital status:    Tobacco Use   • Smoking status: Former     Types: Cigarettes     Quit date: 2013     Years since quittin.8   • Smokeless tobacco: Never   Substance and Sexual Activity   • Alcohol use: No   • Drug use: No   • Sexual activity: Defer           Objective   Physical Exam  Vitals and nursing note reviewed.   Constitutional:       Appearance: Normal appearance.   HENT:      Head: Normocephalic and atraumatic.      Mouth/Throat:      Mouth: Mucous membranes are moist.   Eyes:      Conjunctiva/sclera: Conjunctivae normal.   Cardiovascular:      Rate and Rhythm: Normal rate and regular rhythm.      Heart sounds: Normal heart sounds.   Pulmonary:      Effort: Pulmonary effort is normal.      Breath sounds: Normal breath sounds. No  wheezing, rhonchi or rales.   Abdominal:      General: Bowel sounds are normal.      Palpations: Abdomen is soft.      Tenderness: There is no abdominal tenderness.   Musculoskeletal:      Cervical back: Normal range of motion and neck supple.   Skin:     General: Skin is warm and dry.   Neurological:      General: No focal deficit present.      Mental Status: She is alert and oriented to person, place, and time.   Psychiatric:         Mood and Affect: Mood normal.         Behavior: Behavior normal.         Procedures           ED Course                                           MDM     Patient is resting comfortably nontoxic-appearing.  Patient is in no respiratory distress.  Patient is not requiring any oxygen.  Patient was negative for COVID-19.  Patient was negative for RSV.  Patient did test positive for influenza A.  Patient was advised to follow-up with primary care provider and return to the emergency room with new or worsening symptoms.  Patient verbalized understanding was agreeable to plan as discussed.    Final diagnoses:   Influenza A       ED Disposition  ED Disposition     ED Disposition   Discharge    Condition   Stable    Comment   --             [facility]  [address] [place] [id number] [phone]    As needed, If symptoms worsen         Medication List      No changes were made to your prescriptions during this visit.          Mono Rodriguez MD  11/10/22 0573

## 2023-04-15 ENCOUNTER — APPOINTMENT (OUTPATIENT)
Dept: GENERAL RADIOLOGY | Age: 63
DRG: 194 | End: 2023-04-15
Payer: COMMERCIAL

## 2023-04-15 ENCOUNTER — HOSPITAL ENCOUNTER (INPATIENT)
Age: 63
LOS: 1 days | Discharge: HOME | DRG: 194 | End: 2023-04-16
Attending: PEDIATRICS | Admitting: INTERNAL MEDICINE
Payer: COMMERCIAL

## 2023-04-15 DIAGNOSIS — R00.0 TACHYCARDIA: ICD-10-CM

## 2023-04-15 DIAGNOSIS — J44.1 COPD EXACERBATION (HCC): Primary | ICD-10-CM

## 2023-04-15 DIAGNOSIS — J41.0 SIMPLE CHRONIC BRONCHITIS (HCC): ICD-10-CM

## 2023-04-15 DIAGNOSIS — R51.9 ACUTE NONINTRACTABLE HEADACHE, UNSPECIFIED HEADACHE TYPE: ICD-10-CM

## 2023-04-15 DIAGNOSIS — J18.9 PNEUMONIA OF BOTH LOWER LOBES DUE TO INFECTIOUS ORGANISM: ICD-10-CM

## 2023-04-15 PROBLEM — J16.0 CAP (COMMUNITY ACQUIRED PNEUMONIA) DUE TO CHLAMYDIA SPECIES: Status: ACTIVE | Noted: 2023-04-15

## 2023-04-15 PROBLEM — J44.9 COPD (CHRONIC OBSTRUCTIVE PULMONARY DISEASE) (HCC): Status: ACTIVE | Noted: 2023-04-15

## 2023-04-15 LAB
ALBUMIN SERPL-MCNC: 3.7 G/DL (ref 3.5–5.2)
ALP SERPL-CCNC: 83 U/L (ref 35–104)
ALT SERPL-CCNC: 8 U/L (ref 5–33)
ANION GAP SERPL CALCULATED.3IONS-SCNC: 12 MMOL/L (ref 7–19)
AST SERPL-CCNC: 13 U/L (ref 5–32)
BASOPHILS # BLD: 0 K/UL (ref 0–0.2)
BASOPHILS NFR BLD: 0.3 % (ref 0–1)
BILIRUB SERPL-MCNC: 0.7 MG/DL (ref 0.2–1.2)
BNP BLD-MCNC: 595 PG/ML (ref 0–124)
BUN SERPL-MCNC: 11 MG/DL (ref 8–23)
CALCIUM SERPL-MCNC: 9.3 MG/DL (ref 8.8–10.2)
CHLORIDE SERPL-SCNC: 98 MMOL/L (ref 98–111)
CO2 SERPL-SCNC: 23 MMOL/L (ref 22–29)
CREAT SERPL-MCNC: 0.5 MG/DL (ref 0.5–0.9)
D DIMER PPP FEU-MCNC: 0.78 UG/ML FEU (ref 0–0.48)
EOSINOPHIL # BLD: 0 K/UL (ref 0–0.6)
EOSINOPHIL NFR BLD: 0 % (ref 0–5)
ERYTHROCYTE [DISTWIDTH] IN BLOOD BY AUTOMATED COUNT: 13.2 % (ref 11.5–14.5)
GLUCOSE SERPL-MCNC: 125 MG/DL (ref 74–109)
HCT VFR BLD AUTO: 47 % (ref 37–47)
HGB BLD-MCNC: 14.7 G/DL (ref 12–16)
IMM GRANULOCYTES # BLD: 0.1 K/UL
LACTATE BLDV-SCNC: 1.3 MMOL/L (ref 0.5–1.9)
LYMPHOCYTES # BLD: 1.5 K/UL (ref 1.1–4.5)
LYMPHOCYTES NFR BLD: 10 % (ref 20–40)
MAGNESIUM SERPL-MCNC: 1.7 MG/DL (ref 1.6–2.4)
MCH RBC QN AUTO: 28 PG (ref 27–31)
MCHC RBC AUTO-ENTMCNC: 31.3 G/DL (ref 33–37)
MCV RBC AUTO: 89.5 FL (ref 81–99)
MONOCYTES # BLD: 0.9 K/UL (ref 0–0.9)
MONOCYTES NFR BLD: 5.8 % (ref 0–10)
NEUTROPHILS # BLD: 12.5 K/UL (ref 1.5–7.5)
NEUTS SEG NFR BLD: 83.5 % (ref 50–65)
PLATELET # BLD AUTO: 215 K/UL (ref 130–400)
PMV BLD AUTO: 9.7 FL (ref 9.4–12.3)
POTASSIUM SERPL-SCNC: 3.9 MMOL/L (ref 3.5–5)
PROT SERPL-MCNC: 6.6 G/DL (ref 6.6–8.7)
RBC # BLD AUTO: 5.25 M/UL (ref 4.2–5.4)
REASON FOR REJECTION: NORMAL
REJECTED TEST: NORMAL
SARS-COV-2 RDRP RESP QL NAA+PROBE: NOT DETECTED
SODIUM SERPL-SCNC: 133 MMOL/L (ref 136–145)
TROPONIN T SERPL-MCNC: <0.01 NG/ML (ref 0–0.03)
WBC # BLD AUTO: 14.9 K/UL (ref 4.8–10.8)

## 2023-04-15 PROCEDURE — 6360000002 HC RX W HCPCS: Performed by: PEDIATRICS

## 2023-04-15 PROCEDURE — 80053 COMPREHEN METABOLIC PANEL: CPT

## 2023-04-15 PROCEDURE — 96365 THER/PROPH/DIAG IV INF INIT: CPT

## 2023-04-15 PROCEDURE — 83605 ASSAY OF LACTIC ACID: CPT

## 2023-04-15 PROCEDURE — 99285 EMERGENCY DEPT VISIT HI MDM: CPT

## 2023-04-15 PROCEDURE — 93005 ELECTROCARDIOGRAM TRACING: CPT | Performed by: PEDIATRICS

## 2023-04-15 PROCEDURE — 2580000003 HC RX 258: Performed by: PEDIATRICS

## 2023-04-15 PROCEDURE — 1210000000 HC MED SURG R&B

## 2023-04-15 PROCEDURE — 36415 COLL VENOUS BLD VENIPUNCTURE: CPT

## 2023-04-15 PROCEDURE — 71045 X-RAY EXAM CHEST 1 VIEW: CPT

## 2023-04-15 PROCEDURE — 2580000003 HC RX 258: Performed by: NURSE PRACTITIONER

## 2023-04-15 PROCEDURE — 83735 ASSAY OF MAGNESIUM: CPT

## 2023-04-15 PROCEDURE — 84484 ASSAY OF TROPONIN QUANT: CPT

## 2023-04-15 PROCEDURE — 6360000002 HC RX W HCPCS: Performed by: NURSE PRACTITIONER

## 2023-04-15 PROCEDURE — 85025 COMPLETE CBC W/AUTO DIFF WBC: CPT

## 2023-04-15 PROCEDURE — 83880 ASSAY OF NATRIURETIC PEPTIDE: CPT

## 2023-04-15 PROCEDURE — 96375 TX/PRO/DX INJ NEW DRUG ADDON: CPT

## 2023-04-15 PROCEDURE — 85379 FIBRIN DEGRADATION QUANT: CPT

## 2023-04-15 PROCEDURE — 94640 AIRWAY INHALATION TREATMENT: CPT

## 2023-04-15 PROCEDURE — 87040 BLOOD CULTURE FOR BACTERIA: CPT

## 2023-04-15 PROCEDURE — 6370000000 HC RX 637 (ALT 250 FOR IP): Performed by: NURSE PRACTITIONER

## 2023-04-15 PROCEDURE — 6370000000 HC RX 637 (ALT 250 FOR IP): Performed by: PEDIATRICS

## 2023-04-15 PROCEDURE — 87635 SARS-COV-2 COVID-19 AMP PRB: CPT

## 2023-04-15 PROCEDURE — 96374 THER/PROPH/DIAG INJ IV PUSH: CPT

## 2023-04-15 RX ORDER — METHYLPREDNISOLONE SODIUM SUCCINATE 125 MG/2ML
125 INJECTION, POWDER, LYOPHILIZED, FOR SOLUTION INTRAMUSCULAR; INTRAVENOUS ONCE
Status: COMPLETED | OUTPATIENT
Start: 2023-04-15 | End: 2023-04-15

## 2023-04-15 RX ORDER — POLYETHYLENE GLYCOL 3350 17 G/17G
17 POWDER, FOR SOLUTION ORAL DAILY PRN
Status: DISCONTINUED | OUTPATIENT
Start: 2023-04-15 | End: 2023-04-16 | Stop reason: HOSPADM

## 2023-04-15 RX ORDER — IPRATROPIUM BROMIDE AND ALBUTEROL SULFATE 2.5; .5 MG/3ML; MG/3ML
1 SOLUTION RESPIRATORY (INHALATION) ONCE
Status: COMPLETED | OUTPATIENT
Start: 2023-04-15 | End: 2023-04-15

## 2023-04-15 RX ORDER — SODIUM CHLORIDE, SODIUM LACTATE, POTASSIUM CHLORIDE, CALCIUM CHLORIDE 600; 310; 30; 20 MG/100ML; MG/100ML; MG/100ML; MG/100ML
INJECTION, SOLUTION INTRAVENOUS CONTINUOUS
Status: DISCONTINUED | OUTPATIENT
Start: 2023-04-15 | End: 2023-04-16

## 2023-04-15 RX ORDER — AZITHROMYCIN 250 MG/1
500 TABLET, FILM COATED ORAL EVERY 24 HOURS
Status: DISCONTINUED | OUTPATIENT
Start: 2023-04-16 | End: 2023-04-16 | Stop reason: HOSPADM

## 2023-04-15 RX ORDER — ONDANSETRON 4 MG/1
4 TABLET, ORALLY DISINTEGRATING ORAL EVERY 8 HOURS PRN
Status: DISCONTINUED | OUTPATIENT
Start: 2023-04-15 | End: 2023-04-16 | Stop reason: HOSPADM

## 2023-04-15 RX ORDER — IPRATROPIUM BROMIDE AND ALBUTEROL SULFATE 2.5; .5 MG/3ML; MG/3ML
1 SOLUTION RESPIRATORY (INHALATION)
Status: DISCONTINUED | OUTPATIENT
Start: 2023-04-15 | End: 2023-04-16 | Stop reason: HOSPADM

## 2023-04-15 RX ORDER — SODIUM CHLORIDE 0.9 % (FLUSH) 0.9 %
5-40 SYRINGE (ML) INJECTION EVERY 12 HOURS SCHEDULED
Status: DISCONTINUED | OUTPATIENT
Start: 2023-04-15 | End: 2023-04-16 | Stop reason: HOSPADM

## 2023-04-15 RX ORDER — ENOXAPARIN SODIUM 100 MG/ML
30 INJECTION SUBCUTANEOUS DAILY
Status: DISCONTINUED | OUTPATIENT
Start: 2023-04-15 | End: 2023-04-16 | Stop reason: HOSPADM

## 2023-04-15 RX ORDER — GUAIFENESIN/DEXTROMETHORPHAN 100-10MG/5
5 SYRUP ORAL EVERY 4 HOURS PRN
Status: DISCONTINUED | OUTPATIENT
Start: 2023-04-15 | End: 2023-04-16 | Stop reason: HOSPADM

## 2023-04-15 RX ORDER — SODIUM CHLORIDE 9 MG/ML
INJECTION, SOLUTION INTRAVENOUS PRN
Status: DISCONTINUED | OUTPATIENT
Start: 2023-04-15 | End: 2023-04-16 | Stop reason: HOSPADM

## 2023-04-15 RX ORDER — SODIUM CHLORIDE, SODIUM LACTATE, POTASSIUM CHLORIDE, AND CALCIUM CHLORIDE .6; .31; .03; .02 G/100ML; G/100ML; G/100ML; G/100ML
30 INJECTION, SOLUTION INTRAVENOUS ONCE
Status: COMPLETED | OUTPATIENT
Start: 2023-04-15 | End: 2023-04-15

## 2023-04-15 RX ORDER — SODIUM CHLORIDE 0.9 % (FLUSH) 0.9 %
5-40 SYRINGE (ML) INJECTION PRN
Status: DISCONTINUED | OUTPATIENT
Start: 2023-04-15 | End: 2023-04-16 | Stop reason: HOSPADM

## 2023-04-15 RX ORDER — KETOROLAC TROMETHAMINE 30 MG/ML
15 INJECTION, SOLUTION INTRAMUSCULAR; INTRAVENOUS ONCE
Status: COMPLETED | OUTPATIENT
Start: 2023-04-15 | End: 2023-04-15

## 2023-04-15 RX ORDER — ONDANSETRON 2 MG/ML
4 INJECTION INTRAMUSCULAR; INTRAVENOUS EVERY 6 HOURS PRN
Status: DISCONTINUED | OUTPATIENT
Start: 2023-04-15 | End: 2023-04-16 | Stop reason: HOSPADM

## 2023-04-15 RX ADMIN — SODIUM CHLORIDE, POTASSIUM CHLORIDE, SODIUM LACTATE AND CALCIUM CHLORIDE 1251 ML: 600; 310; 30; 20 INJECTION, SOLUTION INTRAVENOUS at 18:02

## 2023-04-15 RX ADMIN — CEFTRIAXONE 1000 MG: 1 INJECTION, POWDER, FOR SOLUTION INTRAMUSCULAR; INTRAVENOUS at 17:58

## 2023-04-15 RX ADMIN — IPRATROPIUM BROMIDE AND ALBUTEROL SULFATE 1 AMPULE: 2.5; .5 SOLUTION RESPIRATORY (INHALATION) at 16:19

## 2023-04-15 RX ADMIN — SODIUM CHLORIDE, POTASSIUM CHLORIDE, SODIUM LACTATE AND CALCIUM CHLORIDE: 600; 310; 30; 20 INJECTION, SOLUTION INTRAVENOUS at 19:50

## 2023-04-15 RX ADMIN — KETOROLAC TROMETHAMINE 15 MG: 30 INJECTION, SOLUTION INTRAMUSCULAR; INTRAVENOUS at 18:07

## 2023-04-15 RX ADMIN — GUAIFENESIN SYRUP AND DEXTROMETHORPHAN 5 ML: 100; 10 SYRUP ORAL at 19:50

## 2023-04-15 RX ADMIN — ENOXAPARIN SODIUM 30 MG: 100 INJECTION SUBCUTANEOUS at 19:50

## 2023-04-15 RX ADMIN — SODIUM CHLORIDE, PRESERVATIVE FREE 10 ML: 5 INJECTION INTRAVENOUS at 19:50

## 2023-04-15 RX ADMIN — AZITHROMYCIN MONOHYDRATE 500 MG: 500 INJECTION, POWDER, LYOPHILIZED, FOR SOLUTION INTRAVENOUS at 18:05

## 2023-04-15 RX ADMIN — METHYLPREDNISOLONE SODIUM SUCCINATE 125 MG: 125 INJECTION, POWDER, FOR SOLUTION INTRAMUSCULAR; INTRAVENOUS at 16:03

## 2023-04-15 RX ADMIN — IPRATROPIUM BROMIDE AND ALBUTEROL SULFATE 1 AMPULE: .5; 2.5 SOLUTION RESPIRATORY (INHALATION) at 19:49

## 2023-04-15 ASSESSMENT — ENCOUNTER SYMPTOMS
ALLERGIC/IMMUNOLOGIC NEGATIVE: 1
ABDOMINAL DISTENTION: 0
DIARRHEA: 0
VOMITING: 0
COUGH: 1
ABDOMINAL PAIN: 0
SHORTNESS OF BREATH: 1
CONSTIPATION: 0
COLOR CHANGE: 0
RHINORRHEA: 0
BLOOD IN STOOL: 0
NAUSEA: 0
EYES NEGATIVE: 1

## 2023-04-15 ASSESSMENT — PAIN SCALES - GENERAL
PAINLEVEL_OUTOF10: 0
PAINLEVEL_OUTOF10: 5

## 2023-04-15 ASSESSMENT — PAIN - FUNCTIONAL ASSESSMENT: PAIN_FUNCTIONAL_ASSESSMENT: 0-10

## 2023-04-15 ASSESSMENT — PAIN DESCRIPTION - ORIENTATION: ORIENTATION: RIGHT

## 2023-04-15 ASSESSMENT — PAIN DESCRIPTION - DESCRIPTORS: DESCRIPTORS: ACHING

## 2023-04-15 ASSESSMENT — PAIN DESCRIPTION - LOCATION
LOCATION: HEAD;SHOULDER
LOCATION: HEAD

## 2023-04-16 VITALS
TEMPERATURE: 97.5 F | BODY MASS INDEX: 16.34 KG/M2 | HEART RATE: 65 BPM | DIASTOLIC BLOOD PRESSURE: 59 MMHG | HEIGHT: 64 IN | WEIGHT: 95.7 LBS | RESPIRATION RATE: 16 BRPM | OXYGEN SATURATION: 97 % | SYSTOLIC BLOOD PRESSURE: 96 MMHG

## 2023-04-16 LAB
ALBUMIN SERPL-MCNC: 3 G/DL (ref 3.5–5.2)
ALP SERPL-CCNC: 74 U/L (ref 35–104)
ALT SERPL-CCNC: 7 U/L (ref 5–33)
ANION GAP SERPL CALCULATED.3IONS-SCNC: 11 MMOL/L (ref 7–19)
AST SERPL-CCNC: 9 U/L (ref 5–32)
B PARAP IS1001 DNA NPH QL NAA+NON-PROBE: NOT DETECTED
B PERT.PT PRMT NPH QL NAA+NON-PROBE: NOT DETECTED
BASOPHILS # BLD: 0 K/UL (ref 0–0.2)
BASOPHILS NFR BLD: 0.1 % (ref 0–1)
BILIRUB SERPL-MCNC: 0.3 MG/DL (ref 0.2–1.2)
BUN SERPL-MCNC: 16 MG/DL (ref 8–23)
C PNEUM DNA NPH QL NAA+NON-PROBE: NOT DETECTED
CALCIUM SERPL-MCNC: 9.3 MG/DL (ref 8.8–10.2)
CHLORIDE SERPL-SCNC: 106 MMOL/L (ref 98–111)
CO2 SERPL-SCNC: 22 MMOL/L (ref 22–29)
CREAT SERPL-MCNC: 0.4 MG/DL (ref 0.5–0.9)
EOSINOPHIL # BLD: 0 K/UL (ref 0–0.6)
EOSINOPHIL NFR BLD: 0 % (ref 0–5)
ERYTHROCYTE [DISTWIDTH] IN BLOOD BY AUTOMATED COUNT: 13.2 % (ref 11.5–14.5)
FLUAV RNA NPH QL NAA+NON-PROBE: NOT DETECTED
FLUBV RNA NPH QL NAA+NON-PROBE: NOT DETECTED
GLUCOSE SERPL-MCNC: 152 MG/DL (ref 74–109)
HADV DNA NPH QL NAA+NON-PROBE: NOT DETECTED
HCOV 229E RNA NPH QL NAA+NON-PROBE: NOT DETECTED
HCOV HKU1 RNA NPH QL NAA+NON-PROBE: NOT DETECTED
HCOV NL63 RNA NPH QL NAA+NON-PROBE: NOT DETECTED
HCOV OC43 RNA NPH QL NAA+NON-PROBE: NOT DETECTED
HCT VFR BLD AUTO: 41 % (ref 37–47)
HGB BLD-MCNC: 13 G/DL (ref 12–16)
HMPV RNA NPH QL NAA+NON-PROBE: NOT DETECTED
HPIV1 RNA NPH QL NAA+NON-PROBE: NOT DETECTED
HPIV2 RNA NPH QL NAA+NON-PROBE: NOT DETECTED
HPIV3 RNA NPH QL NAA+NON-PROBE: NOT DETECTED
HPIV4 RNA NPH QL NAA+NON-PROBE: NOT DETECTED
IMM GRANULOCYTES # BLD: 0.1 K/UL
LYMPHOCYTES # BLD: 0.7 K/UL (ref 1.1–4.5)
LYMPHOCYTES NFR BLD: 5.4 % (ref 20–40)
M PNEUMO DNA NPH QL NAA+NON-PROBE: NOT DETECTED
MCH RBC QN AUTO: 28.6 PG (ref 27–31)
MCHC RBC AUTO-ENTMCNC: 31.7 G/DL (ref 33–37)
MCV RBC AUTO: 90.1 FL (ref 81–99)
MONOCYTES # BLD: 0.2 K/UL (ref 0–0.9)
MONOCYTES NFR BLD: 1.2 % (ref 0–10)
NEUTROPHILS # BLD: 12.7 K/UL (ref 1.5–7.5)
NEUTS SEG NFR BLD: 92.9 % (ref 50–65)
PLATELET # BLD AUTO: 192 K/UL (ref 130–400)
PMV BLD AUTO: 9.3 FL (ref 9.4–12.3)
POTASSIUM SERPL-SCNC: 3.9 MMOL/L (ref 3.5–5)
PROT SERPL-MCNC: 5.9 G/DL (ref 6.6–8.7)
RBC # BLD AUTO: 4.55 M/UL (ref 4.2–5.4)
RSV RNA NPH QL NAA+NON-PROBE: NOT DETECTED
RV+EV RNA NPH QL NAA+NON-PROBE: NOT DETECTED
SARS-COV-2 RNA NPH QL NAA+NON-PROBE: NOT DETECTED
SODIUM SERPL-SCNC: 139 MMOL/L (ref 136–145)
WBC # BLD AUTO: 13.7 K/UL (ref 4.8–10.8)

## 2023-04-16 PROCEDURE — 6370000000 HC RX 637 (ALT 250 FOR IP): Performed by: NURSE PRACTITIONER

## 2023-04-16 PROCEDURE — 6360000002 HC RX W HCPCS: Performed by: INTERNAL MEDICINE

## 2023-04-16 PROCEDURE — 6370000000 HC RX 637 (ALT 250 FOR IP): Performed by: INTERNAL MEDICINE

## 2023-04-16 PROCEDURE — 94640 AIRWAY INHALATION TREATMENT: CPT

## 2023-04-16 PROCEDURE — 94760 N-INVAS EAR/PLS OXIMETRY 1: CPT

## 2023-04-16 PROCEDURE — 0202U NFCT DS 22 TRGT SARS-COV-2: CPT

## 2023-04-16 PROCEDURE — 2580000003 HC RX 258: Performed by: NURSE PRACTITIONER

## 2023-04-16 PROCEDURE — 85025 COMPLETE CBC W/AUTO DIFF WBC: CPT

## 2023-04-16 PROCEDURE — 80053 COMPREHEN METABOLIC PANEL: CPT

## 2023-04-16 PROCEDURE — 36415 COLL VENOUS BLD VENIPUNCTURE: CPT

## 2023-04-16 RX ORDER — ACETAMINOPHEN 325 MG/1
650 TABLET ORAL EVERY 4 HOURS PRN
Status: DISCONTINUED | OUTPATIENT
Start: 2023-04-16 | End: 2023-04-16 | Stop reason: HOSPADM

## 2023-04-16 RX ORDER — METHYLPREDNISOLONE SODIUM SUCCINATE 40 MG/ML
40 INJECTION, POWDER, LYOPHILIZED, FOR SOLUTION INTRAMUSCULAR; INTRAVENOUS EVERY 12 HOURS
Status: DISCONTINUED | OUTPATIENT
Start: 2023-04-16 | End: 2023-04-16 | Stop reason: HOSPADM

## 2023-04-16 RX ORDER — IBUPROFEN 400 MG/1
400 TABLET ORAL EVERY 8 HOURS PRN
Status: DISCONTINUED | OUTPATIENT
Start: 2023-04-16 | End: 2023-04-16 | Stop reason: HOSPADM

## 2023-04-16 RX ORDER — DOXYCYCLINE HYCLATE 100 MG
100 TABLET ORAL 2 TIMES DAILY
Qty: 10 TABLET | Refills: 0 | Status: SHIPPED | OUTPATIENT
Start: 2023-04-16 | End: 2023-04-21

## 2023-04-16 RX ORDER — PREDNISONE 10 MG/1
40 TABLET ORAL DAILY
Qty: 16 TABLET | Refills: 0 | Status: SHIPPED | OUTPATIENT
Start: 2023-04-16 | End: 2023-04-20

## 2023-04-16 RX ORDER — ALBUTEROL SULFATE 90 UG/1
2 AEROSOL, METERED RESPIRATORY (INHALATION) 4 TIMES DAILY PRN
Qty: 1 EACH | Refills: 2 | Status: SHIPPED | OUTPATIENT
Start: 2023-04-16

## 2023-04-16 RX ORDER — BUDESONIDE AND FORMOTEROL FUMARATE DIHYDRATE 160; 4.5 UG/1; UG/1
2 AEROSOL RESPIRATORY (INHALATION) 2 TIMES DAILY
Qty: 10.2 G | Refills: 3 | Status: SHIPPED | OUTPATIENT
Start: 2023-04-16

## 2023-04-16 RX ORDER — BUDESONIDE AND FORMOTEROL FUMARATE DIHYDRATE 160; 4.5 UG/1; UG/1
2 AEROSOL RESPIRATORY (INHALATION) 2 TIMES DAILY
Status: DISCONTINUED | OUTPATIENT
Start: 2023-04-16 | End: 2023-04-16 | Stop reason: HOSPADM

## 2023-04-16 RX ADMIN — IPRATROPIUM BROMIDE AND ALBUTEROL SULFATE 1 AMPULE: .5; 2.5 SOLUTION RESPIRATORY (INHALATION) at 07:12

## 2023-04-16 RX ADMIN — METHYLPREDNISOLONE SODIUM SUCCINATE 40 MG: 40 INJECTION, POWDER, FOR SOLUTION INTRAMUSCULAR; INTRAVENOUS at 09:14

## 2023-04-16 RX ADMIN — SODIUM CHLORIDE, PRESERVATIVE FREE 10 ML: 5 INJECTION INTRAVENOUS at 09:16

## 2023-04-16 RX ADMIN — IBUPROFEN 400 MG: 400 TABLET ORAL at 09:14

## 2023-04-16 RX ADMIN — BUDESONIDE AND FORMOTEROL FUMARATE DIHYDRATE 2 PUFF: 160; 4.5 AEROSOL RESPIRATORY (INHALATION) at 08:00

## 2023-04-16 RX ADMIN — IPRATROPIUM BROMIDE AND ALBUTEROL SULFATE 1 AMPULE: .5; 2.5 SOLUTION RESPIRATORY (INHALATION) at 11:13

## 2023-04-16 ASSESSMENT — PAIN SCALES - GENERAL
PAINLEVEL_OUTOF10: 0
PAINLEVEL_OUTOF10: 10

## 2023-04-16 ASSESSMENT — PAIN DESCRIPTION - ONSET: ONSET: GRADUAL

## 2023-04-16 ASSESSMENT — PAIN DESCRIPTION - ORIENTATION: ORIENTATION: RIGHT;LEFT;UPPER

## 2023-04-16 ASSESSMENT — PAIN DESCRIPTION - PAIN TYPE: TYPE: ACUTE PAIN

## 2023-04-16 ASSESSMENT — PAIN - FUNCTIONAL ASSESSMENT: PAIN_FUNCTIONAL_ASSESSMENT: ACTIVITIES ARE NOT PREVENTED

## 2023-04-16 ASSESSMENT — PAIN DESCRIPTION - FREQUENCY: FREQUENCY: INTERMITTENT

## 2023-04-16 ASSESSMENT — PAIN DESCRIPTION - DESCRIPTORS: DESCRIPTORS: ACHING

## 2023-04-16 ASSESSMENT — PAIN DESCRIPTION - LOCATION: LOCATION: HEAD;NECK

## 2023-04-17 ENCOUNTER — TELEPHONE (OUTPATIENT)
Dept: INTERNAL MEDICINE | Age: 63
End: 2023-04-17

## 2023-04-17 LAB
EKG P AXIS: NORMAL DEGREES
EKG P-R INTERVAL: NORMAL MS
EKG Q-T INTERVAL: 328 MS
EKG QRS DURATION: 84 MS
EKG QTC CALCULATION (BAZETT): 468 MS
EKG T AXIS: 16 DEGREES

## 2023-04-17 PROCEDURE — 93010 ELECTROCARDIOGRAM REPORT: CPT | Performed by: INTERNAL MEDICINE

## 2023-04-17 NOTE — TELEPHONE ENCOUNTER
[facility] Transitions Initial Follow Up Call    Outreach made within 2 business days of discharge: Yes    Patient: Sonya Cardenas   Patient : 1960 MRN: [id number]    Reason for Admission: SOB    Discharge Date: 23       Discharge Diagnosis:      COPD (chronic obstructive pulmonary disease) ([facility] [address] [J44.9]      CAP (community acquired pneumonia) due to Chlamydia species        Spoke with: Attempted to make contact with patient/caregiver for an initial transitions of care follow up call post discharge without success. I will reach out again at a later time. Any previously scheduled hospital follow up appointments noted below. Discharge department/facility: [address] [place] [address] Drive CHART  PT    Scheduled appointment with PCP within 7-14 days    Follow Up  No future appointments.     Bear Varela MA

## 2023-04-18 ENCOUNTER — TELEPHONE (OUTPATIENT)
Dept: INTERNAL MEDICINE | Age: 63
End: 2023-04-18

## 2023-04-18 NOTE — TELEPHONE ENCOUNTER
[facility] Transitions Initial Follow Up Call    Outreach made within 2 business days of discharge: Yes    Patient: Valeria Pereyra   Patient : 1960 MRN: [id number]    Reason for Admission: SOB    Discharge Date: 23       Discharge Diagnosis:      COPD (chronic obstructive pulmonary disease) ([facility] [address] [J44.9]      CAP (community acquired pneumonia) due to Chlamydia species        Spoke with: Left 2nd message for pt to call us back to set up a TCM apt and do the TCM note. Discharge department/facility: [address] [place] [address] CHART  PT    Scheduled appointment with PCP within 7-14 days    Follow Up  No future appointments.     Sujata Gastelum MA

## 2023-04-20 LAB
BACTERIA BLD CULT ORG #2: NORMAL
BACTERIA BLD CULT: NORMAL